# Patient Record
Sex: FEMALE | Race: BLACK OR AFRICAN AMERICAN | Employment: UNEMPLOYED | URBAN - METROPOLITAN AREA
[De-identification: names, ages, dates, MRNs, and addresses within clinical notes are randomized per-mention and may not be internally consistent; named-entity substitution may affect disease eponyms.]

---

## 2017-12-07 ENCOUNTER — HOSPITAL ENCOUNTER (EMERGENCY)
Facility: HOSPITAL | Age: 22
Discharge: HOME | End: 2017-12-08
Attending: EMERGENCY MEDICINE

## 2017-12-07 DIAGNOSIS — B34.9 VIRAL SYNDROME: Primary | ICD-10-CM

## 2017-12-07 DIAGNOSIS — M32.9 EXACERBATION OF SYSTEMIC LUPUS (HCC): ICD-10-CM

## 2017-12-07 PROCEDURE — 81025 URINE PREGNANCY TEST: CPT

## 2017-12-07 PROCEDURE — 96361 HYDRATE IV INFUSION ADD-ON: CPT

## 2017-12-07 PROCEDURE — 87798 DETECT AGENT NOS DNA AMP: CPT | Performed by: EMERGENCY MEDICINE

## 2017-12-07 PROCEDURE — 85025 COMPLETE CBC W/AUTO DIFF WBC: CPT | Performed by: EMERGENCY MEDICINE

## 2017-12-07 PROCEDURE — 81001 URINALYSIS AUTO W/SCOPE: CPT | Performed by: EMERGENCY MEDICINE

## 2017-12-07 PROCEDURE — 80048 BASIC METABOLIC PNL TOTAL CA: CPT | Performed by: EMERGENCY MEDICINE

## 2017-12-07 PROCEDURE — 99284 EMERGENCY DEPT VISIT MOD MDM: CPT

## 2017-12-07 PROCEDURE — 87581 M.PNEUMON DNA AMP PROBE: CPT | Performed by: EMERGENCY MEDICINE

## 2017-12-07 PROCEDURE — 87633 RESP VIRUS 12-25 TARGETS: CPT | Performed by: EMERGENCY MEDICINE

## 2017-12-07 PROCEDURE — 96360 HYDRATION IV INFUSION INIT: CPT

## 2017-12-07 PROCEDURE — 87486 CHLMYD PNEUM DNA AMP PROBE: CPT | Performed by: EMERGENCY MEDICINE

## 2017-12-07 RX ORDER — IBUPROFEN 600 MG/1
600 TABLET ORAL ONCE
Status: COMPLETED | OUTPATIENT
Start: 2017-12-07 | End: 2017-12-07

## 2017-12-08 VITALS
DIASTOLIC BLOOD PRESSURE: 64 MMHG | TEMPERATURE: 98 F | OXYGEN SATURATION: 100 % | SYSTOLIC BLOOD PRESSURE: 100 MMHG | HEART RATE: 106 BPM | BODY MASS INDEX: 21.79 KG/M2 | HEIGHT: 60 IN | RESPIRATION RATE: 16 BRPM | WEIGHT: 111 LBS

## 2017-12-08 NOTE — ED PROVIDER NOTES
Patient Seen in: [facility] Emergency Department    History   Patient presents with:  Lupus    Stated Complaint: tingling in legs    HPI    Patient is a 14-year-old female with lupus currently on immunosuppressant and prednisone who presents with URINALYSIS WITH CULTURE REFLEX - Abnormal; Notable for the following:        Result Value    Protein Urine 100  (*)     Ketones Urine 20  (*)     Blood Urine Large (*)     RBC URINE 293 (*)     All other components within normal limits   BASIC METABOLIC KOTA Anthony [facility] [id number]  [phone]              Medications Prescribed:  There are no discharge medications for this patient.

## 2017-12-08 NOTE — ED PROVIDER NOTES
Pt endorsed to me by Dr. Janelle Nevarez. RVP negative for influenza. I discussed with pt on the telephone at 4:40am that RVP is negative and there is no need to start tamiflu. Pt verbalizes understanding. Pt to followup with her PCP in 2-3 days.

## 2017-12-08 NOTE — ED INITIAL ASSESSMENT (HPI)
Pt presents with Lupus \"flare-up\", pain to bilateral groin since this afternoon.   Denies urinary complaints.  + n/v.

## 2018-09-27 ENCOUNTER — HOSPITAL ENCOUNTER (EMERGENCY)
Facility: HOSPITAL | Age: 23
Discharge: HOME | End: 2018-09-27
Attending: EMERGENCY MEDICINE
Payer: COMMERCIAL

## 2018-09-27 VITALS
DIASTOLIC BLOOD PRESSURE: 70 MMHG | HEIGHT: 60 IN | RESPIRATION RATE: 18 BRPM | SYSTOLIC BLOOD PRESSURE: 121 MMHG | BODY MASS INDEX: 25.79 KG/M2 | WEIGHT: 131.38 LBS | OXYGEN SATURATION: 97 % | HEART RATE: 74 BPM | TEMPERATURE: 99 F

## 2018-09-27 DIAGNOSIS — R60.9 DEPENDENT EDEMA: Primary | ICD-10-CM

## 2018-09-27 DIAGNOSIS — N30.01 ACUTE CYSTITIS WITH HEMATURIA: ICD-10-CM

## 2018-09-27 DIAGNOSIS — M32.9 SYSTEMIC LUPUS ERYTHEMATOSUS, UNSPECIFIED SLE TYPE, UNSPECIFIED ORGAN INVOLVEMENT STATUS (HCC): ICD-10-CM

## 2018-09-27 PROCEDURE — 36415 COLL VENOUS BLD VENIPUNCTURE: CPT

## 2018-09-27 PROCEDURE — 87086 URINE CULTURE/COLONY COUNT: CPT | Performed by: EMERGENCY MEDICINE

## 2018-09-27 PROCEDURE — 81001 URINALYSIS AUTO W/SCOPE: CPT | Performed by: EMERGENCY MEDICINE

## 2018-09-27 PROCEDURE — 85025 COMPLETE CBC W/AUTO DIFF WBC: CPT | Performed by: EMERGENCY MEDICINE

## 2018-09-27 PROCEDURE — 99284 EMERGENCY DEPT VISIT MOD MDM: CPT

## 2018-09-27 PROCEDURE — 80048 BASIC METABOLIC PNL TOTAL CA: CPT | Performed by: EMERGENCY MEDICINE

## 2018-09-27 RX ORDER — PREDNISONE 20 MG/1
30 TABLET ORAL ONCE
Status: COMPLETED | OUTPATIENT
Start: 2018-09-27 | End: 2018-09-27

## 2018-09-27 RX ORDER — SIMVASTATIN 20 MG
20 TABLET ORAL NIGHTLY
COMMUNITY
End: 2018-11-28

## 2018-09-27 RX ORDER — PREDNISONE 2.5 MG
60 TABLET ORAL DAILY
Status: ON HOLD | COMMUNITY
End: 2018-10-13

## 2018-09-27 RX ORDER — AZATHIOPRINE 50 MG/1
50 TABLET ORAL DAILY
Status: ON HOLD | COMMUNITY
End: 2018-10-13

## 2018-09-27 RX ORDER — FUROSEMIDE 20 MG/1
20 TABLET ORAL 2 TIMES DAILY
Status: ON HOLD | COMMUNITY
End: 2018-10-19

## 2018-09-27 RX ORDER — NITROFURANTOIN 25; 75 MG/1; MG/1
100 CAPSULE ORAL 2 TIMES DAILY
Qty: 10 CAPSULE | Refills: 0 | Status: SHIPPED | OUTPATIENT
Start: 2018-09-27 | End: 2018-10-02

## 2018-09-27 RX ORDER — PREDNISONE 10 MG/1
30 TABLET ORAL DAILY
Qty: 9 TABLET | Refills: 0 | Status: SHIPPED | OUTPATIENT
Start: 2018-09-27 | End: 2018-09-30

## 2018-09-27 NOTE — ED PROVIDER NOTES
Patient Seen in: [facility] AND [facility] Emergency Department    History   Patient presents with:  Swelling Edema (cardiovascular, metabolic)    Stated Complaint: Lupas FLare/ Edma in legs    HPI    Patient presents with her mother with concerns of swelling to pain, no nausea, no vomiting  Genitourinary: no dysuria  Musculoskeletal: no back pain    Positive for stated complaint: Lupas FLare/ Edma in legs  Other systems are as noted in HPI. Constitutional and vital signs reviewed.       All other systems reviewed wanted to discuss potentially putting her on higher dose of prednisone for a short period of time to potentially help her.   At this point we have not received return phone call I discussed with her going up on the dose at this time she did want to try it w RAINBOW DRAW DARK GREEN   RAINBOW DRAW LIGHT GREEN   RAINBOW DRAW GOLD   RAINBOW DRAW LAVENDER TALL (BNP)   URINE CULTURE, ROUTINE        MDM     99% Normal  Pulse oximetry         Disposition and Plan     We recommend that you schedule follow up care wi

## 2018-09-27 NOTE — ED INITIAL ASSESSMENT (HPI)
Pt has lupus and is experiencing some swelling to face, abdomen and both legs. States she saw her nephrologist this week and was started on a diuretic, but swelling has not changed. Denies any SOB or dyspnea.

## 2018-10-01 ENCOUNTER — TELEPHONE (OUTPATIENT)
Dept: RHEUMATOLOGY | Facility: CLINIC | Age: 23
End: 2018-10-01

## 2018-10-13 ENCOUNTER — HOSPITAL ENCOUNTER (INPATIENT)
Facility: HOSPITAL | Age: 23
LOS: 6 days | Discharge: HOME | DRG: 546 | End: 2018-10-19
Attending: EMERGENCY MEDICINE | Admitting: HOSPITALIST
Payer: COMMERCIAL

## 2018-10-13 ENCOUNTER — APPOINTMENT (OUTPATIENT)
Dept: ULTRASOUND IMAGING | Facility: HOSPITAL | Age: 23
DRG: 546 | End: 2018-10-13
Attending: EMERGENCY MEDICINE
Payer: COMMERCIAL

## 2018-10-13 ENCOUNTER — APPOINTMENT (OUTPATIENT)
Dept: CT IMAGING | Facility: HOSPITAL | Age: 23
DRG: 546 | End: 2018-10-13
Attending: EMERGENCY MEDICINE
Payer: COMMERCIAL

## 2018-10-13 DIAGNOSIS — N18.9 ACUTE RENAL FAILURE SUPERIMPOSED ON CHRONIC KIDNEY DISEASE, UNSPECIFIED CKD STAGE, UNSPECIFIED ACUTE RENAL FAILURE TYPE (HCC): Primary | ICD-10-CM

## 2018-10-13 DIAGNOSIS — N17.9 ACUTE RENAL FAILURE SUPERIMPOSED ON CHRONIC KIDNEY DISEASE, UNSPECIFIED CKD STAGE, UNSPECIFIED ACUTE RENAL FAILURE TYPE (HCC): Primary | ICD-10-CM

## 2018-10-13 DIAGNOSIS — E87.5 HYPERKALEMIA: ICD-10-CM

## 2018-10-13 PROCEDURE — 76770 US EXAM ABDO BACK WALL COMP: CPT | Performed by: EMERGENCY MEDICINE

## 2018-10-13 PROCEDURE — 74176 CT ABD & PELVIS W/O CONTRAST: CPT | Performed by: EMERGENCY MEDICINE

## 2018-10-13 PROCEDURE — 99223 1ST HOSP IP/OBS HIGH 75: CPT | Performed by: HOSPITALIST

## 2018-10-13 RX ORDER — PREDNISONE 20 MG/1
20 TABLET ORAL 3 TIMES DAILY
Status: ON HOLD | COMMUNITY
End: 2018-11-01

## 2018-10-13 RX ORDER — METOCLOPRAMIDE HYDROCHLORIDE 5 MG/ML
INJECTION INTRAMUSCULAR; INTRAVENOUS
Status: DISPENSED
Start: 2018-10-13 | End: 2018-10-14

## 2018-10-13 RX ORDER — HEPARIN SODIUM 5000 [USP'U]/ML
5000 INJECTION, SOLUTION INTRAVENOUS; SUBCUTANEOUS EVERY 12 HOURS
Status: DISCONTINUED | OUTPATIENT
Start: 2018-10-14 | End: 2018-10-19

## 2018-10-13 RX ORDER — SODIUM CHLORIDE 0.9 % (FLUSH) 0.9 %
3 SYRINGE (ML) INJECTION AS NEEDED
Status: DISCONTINUED | OUTPATIENT
Start: 2018-10-13 | End: 2018-10-19

## 2018-10-13 RX ORDER — METOCLOPRAMIDE 10 MG/1
10 TABLET ORAL 3 TIMES DAILY PRN
COMMUNITY

## 2018-10-13 RX ORDER — METOCLOPRAMIDE HYDROCHLORIDE 5 MG/ML
10 INJECTION INTRAMUSCULAR; INTRAVENOUS ONCE
Status: DISCONTINUED | OUTPATIENT
Start: 2018-10-13 | End: 2018-10-13

## 2018-10-13 RX ORDER — SODIUM CHLORIDE 9 MG/ML
INJECTION, SOLUTION INTRAVENOUS ONCE
Status: COMPLETED | OUTPATIENT
Start: 2018-10-13 | End: 2018-10-13

## 2018-10-13 RX ORDER — SODIUM POLYSTYRENE SULFONATE 15 G/60ML
30 SUSPENSION ORAL; RECTAL ONCE
Status: COMPLETED | OUTPATIENT
Start: 2018-10-13 | End: 2018-10-13

## 2018-10-13 RX ORDER — MORPHINE SULFATE 4 MG/ML
INJECTION, SOLUTION INTRAMUSCULAR; INTRAVENOUS
Status: DISPENSED
Start: 2018-10-13 | End: 2018-10-14

## 2018-10-13 RX ORDER — HYDROMORPHONE HYDROCHLORIDE 1 MG/ML
0.3 INJECTION, SOLUTION INTRAMUSCULAR; INTRAVENOUS; SUBCUTANEOUS ONCE
Status: COMPLETED | OUTPATIENT
Start: 2018-10-13 | End: 2018-10-13

## 2018-10-13 RX ORDER — MORPHINE SULFATE 4 MG/ML
4 INJECTION, SOLUTION INTRAMUSCULAR; INTRAVENOUS ONCE
Status: DISCONTINUED | OUTPATIENT
Start: 2018-10-13 | End: 2018-10-13

## 2018-10-13 RX ORDER — DEXTROSE MONOHYDRATE 25 G/50ML
50 INJECTION, SOLUTION INTRAVENOUS ONCE
Status: COMPLETED | OUTPATIENT
Start: 2018-10-13 | End: 2018-10-13

## 2018-10-13 RX ORDER — ALBUMIN (HUMAN) 12.5 G/50ML
25 SOLUTION INTRAVENOUS ONCE
Status: COMPLETED | OUTPATIENT
Start: 2018-10-13 | End: 2018-10-13

## 2018-10-13 RX ORDER — HYDROCODONE BITARTRATE AND ACETAMINOPHEN 5; 325 MG/1; MG/1
1 TABLET ORAL EVERY 6 HOURS PRN
Status: DISCONTINUED | OUTPATIENT
Start: 2018-10-13 | End: 2018-10-19

## 2018-10-13 RX ORDER — METHYLPREDNISOLONE SODIUM SUCCINATE 40 MG/ML
40 INJECTION, POWDER, LYOPHILIZED, FOR SOLUTION INTRAMUSCULAR; INTRAVENOUS EVERY 8 HOURS
Status: DISCONTINUED | OUTPATIENT
Start: 2018-10-13 | End: 2018-10-15

## 2018-10-13 RX ORDER — SULFAMETHOXAZOLE AND TRIMETHOPRIM 800; 160 MG/1; MG/1
1 TABLET ORAL DAILY
COMMUNITY
End: 2018-11-28

## 2018-10-13 NOTE — ED INITIAL ASSESSMENT (HPI)
Pt states she is having a lupus flare up. Pt states she is having abdominal swelling which is interfering with her breathing. Pt states last time it was like this she had to have her abdomen drained.

## 2018-10-13 NOTE — ED PROVIDER NOTES
Patient Seen in: [facility] Emergency Department    History   Patient presents with:  Swelling Edema (cardiovascular, metabolic)    Stated Complaint: lupus flare up     HPI    25 yo F with PMH SLE on chronic prednisone/lasix therapy presenting with Pulmonary/Chest: Effort normal. CTAB. Abdominal: Soft. Distended, nontender. Musculoskeletal: No gross deformity. Neurological: Alert. Skin: Skin is warm. Psychiatric: Cooperative. Nursing note and vitals reviewed.         ED Course     Labs Revie components:    Complement C3 69 (*)     All other components within normal limits   POCT GLUCOSE - Abnormal; Notable for the following components:    POC Glucose  132 (*)     All other components within normal limits   POCT GLUCOSE - Abnormal; Notable for 109  , sinus,      Evaluation for worsening abdominal distension without CP/SOB labs with marked LAURA/hyperkalemia, EKG nonacute.  Insulin/D50, bicarb, polystyrene initiated; no doc coverage Dr. Meagan Hansen accepting admission, nephrology Dr. Murali Mercado

## 2018-10-14 PROCEDURE — 99253 IP/OBS CNSLTJ NEW/EST LOW 45: CPT | Performed by: INTERNAL MEDICINE

## 2018-10-14 PROCEDURE — 99254 IP/OBS CNSLTJ NEW/EST MOD 60: CPT | Performed by: INTERNAL MEDICINE

## 2018-10-14 PROCEDURE — 99255 IP/OBS CONSLTJ NEW/EST HI 80: CPT | Performed by: INTERNAL MEDICINE

## 2018-10-14 PROCEDURE — 99233 SBSQ HOSP IP/OBS HIGH 50: CPT | Performed by: HOSPITALIST

## 2018-10-14 RX ORDER — ONDANSETRON 2 MG/ML
4 INJECTION INTRAMUSCULAR; INTRAVENOUS EVERY 6 HOURS PRN
Status: DISCONTINUED | OUTPATIENT
Start: 2018-10-14 | End: 2018-10-19

## 2018-10-14 RX ORDER — ATORVASTATIN CALCIUM 10 MG/1
10 TABLET, FILM COATED ORAL NIGHTLY
Status: DISCONTINUED | OUTPATIENT
Start: 2018-10-14 | End: 2018-10-19

## 2018-10-14 RX ORDER — ALBUMIN (HUMAN) 12.5 G/50ML
25 SOLUTION INTRAVENOUS EVERY 12 HOURS
Status: DISCONTINUED | OUTPATIENT
Start: 2018-10-14 | End: 2018-10-18

## 2018-10-14 RX ORDER — FUROSEMIDE 10 MG/ML
80 INJECTION INTRAMUSCULAR; INTRAVENOUS
Status: DISCONTINUED | OUTPATIENT
Start: 2018-10-14 | End: 2018-10-19

## 2018-10-14 RX ORDER — PANTOPRAZOLE SODIUM 40 MG/1
40 TABLET, DELAYED RELEASE ORAL
Status: DISCONTINUED | OUTPATIENT
Start: 2018-10-14 | End: 2018-10-14

## 2018-10-14 RX ORDER — CYANOCOBALAMIN 1000 UG/ML
1000 INJECTION INTRAMUSCULAR; SUBCUTANEOUS ONCE
Status: COMPLETED | OUTPATIENT
Start: 2018-10-14 | End: 2018-10-14

## 2018-10-14 RX ORDER — SODIUM POLYSTYRENE SULFONATE 15 G/60ML
30 SUSPENSION ORAL; RECTAL ONCE
Status: COMPLETED | OUTPATIENT
Start: 2018-10-14 | End: 2018-10-14

## 2018-10-14 RX ORDER — SODIUM CHLORIDE 9 MG/ML
INJECTION, SOLUTION INTRAVENOUS
Status: COMPLETED
Start: 2018-10-14 | End: 2018-10-14

## 2018-10-14 RX ORDER — HYDROXYCHLOROQUINE SULFATE 200 MG/1
200 TABLET, FILM COATED ORAL 2 TIMES DAILY
Status: DISCONTINUED | OUTPATIENT
Start: 2018-10-14 | End: 2018-10-19

## 2018-10-14 RX ORDER — DEXTROSE MONOHYDRATE 25 G/50ML
50 INJECTION, SOLUTION INTRAVENOUS ONCE
Status: COMPLETED | OUTPATIENT
Start: 2018-10-14 | End: 2018-10-14

## 2018-10-14 NOTE — PROGRESS NOTES
[facility]  Progress Note     Carlos Enrique Alvarado  : 2/15/1995    Status: Inpatient  Day #: 1    Attending: Jessica Montalvo MD  PCP: None Pcp      Assessment and Plan     Systemic lupus erythematosus - followed by Dr. Oswaldo Torres, her nephrologist at [facility] 10/13/18   1759  10/13/18   6493  10/14/18   0540   BUN  71*   --   70*   CREATSERUM  3.58*   --   3.39*   GFRAA  19*   --   21*   GFRNAA  17*   --   18*   CA  7.3*   --   7.6*   ALB  1.2*   --   1.8*   NA  136   --   142   K  6.0*  6.0*  5.2*   CL  112* MethylPREDNISolone Sodium Succ  40 mg Intravenous Q8H   • cefTRIAXone (ROCEPHIN) 1g/100mL  1 g Intravenous Q24H      PRN Meds: Normal Saline Flush, HYDROcodone-acetaminophen    Spent <35 minutes, <50% of the time counseling coordinating care.   Discussed la

## 2018-10-14 NOTE — PROGRESS NOTES
ADMISSION NOTE    24year old female with h/o lupus on steroids and azothiaprine until recently. Azothiaprine discontinued when patient received a \"chemo\" infusion at [facility] this past Tuesday.   Presents with anasarca, ascites and acute renal f

## 2018-10-14 NOTE — CONSULTS
Full note dictated. Discussed with Dr. Diann Salazar, hospitalist.  Chart reviewed, patient interviewed and examined.     Lupus was diagnosed in 2013 with kidney biopsy, ordered by Dr. Tigist Zacarias, her nephrologist at [facility].  In 2013 she received 6 doses of intra prednisone soon. Gastroenterology is evaluating. She may need paracentesis. LIGIA and specific antibodies have been ordered. I will order antiphospholipid antibody tests. I started her back on hydroxychloroquine 200 mg twice a day.     Rheumatology will

## 2018-10-14 NOTE — CONSULTS
[facility]    PATIENT'S NAME: Orprecious Skelton   ATTENDING PHYSICIAN: Jennifer Farmer MD   CONSULTING PHYSICIAN: Brigid Lucio MD   PATIENT ACCOUNT#:   [id number]    LOCATION:  [address] [place] [address]. [facility] #:   [id number]       DATE OF BIRTH:  [birth year] furosemide 20 mg b.i.d., and Reglan. ALLERGIES:  She is allergic to mycophenolate. SOCIAL HISTORY:  She is nonsmoker, and lives with her family. FAMILY HISTORY:  Negative for any connective tissue disorders.     REVIEW OF SYSTEMS:  The patient cu present time or what her baseline renal function is. We are attempting to obtain old records from [facility].  The patient has severe nephrotic syndrome leading to anasarca. Currently, her symptoms seem better today. She also has an anemia.   This

## 2018-10-14 NOTE — PLAN OF CARE
Problem: Patient Centered Care  Goal: Patient preferences are identified and integrated in the patient's plan of care  Interventions:  - What would you like us to know as we care for you?  I was just discharged from [facility] last Wednesday and I rec monitoring, monitor vital signs, obtain 12 lead EKG if indicated  - Evaluate effectiveness of antiarrhythmic and heart rate control medications as ordered  - Initiate emergency measures for life threatening arrhythmias  - Monitor electrolytes and administe psychosocial factors contributing to over-consumption  Outcome: Progressing      Problem: METABOLIC/FLUID AND ELECTROLYTES - ADULT  Goal: Glucose maintained within prescribed range  INTERVENTIONS:  - Monitor Blood Glucose as ordered  - Assess for signs and Consider cultural and social influences on pain and pain management  - Manage/alleviate anxiety  - Utilize distraction and/or relaxation techniques  - Monitor for opioid side effects  - Notify MD/LIP if interventions unsuccessful or patient reports new krissy

## 2018-10-14 NOTE — PLAN OF CARE
Problem: Patient Centered Care  Goal: Patient preferences are identified and integrated in the patient's plan of care  Interventions:  - What would you like us to know as we care for you?  I was just discharged from [facility] last Wednesday and I rec monitoring, monitor vital signs, obtain 12 lead EKG if indicated  - Evaluate effectiveness of antiarrhythmic and heart rate control medications as ordered  - Initiate emergency measures for life threatening arrhythmias  - Monitor electrolytes and administe scale  - Administer analgesics based on type and severity of pain and evaluate response  - Implement non-pharmacological measures as appropriate and evaluate response  - Consider cultural and social influences on pain and pain management  - Manage/alleviat

## 2018-10-14 NOTE — CONSULTS
Gastroenterology consultation note    Reason for consultation:  Ascites, transient abdominal pain in the setting of severe SLE      History of present illness: The patient is a 24year old female who is seen at the bedside today along with her mother.   Silvia Acevedo Subcutaneous Q12H   HYDROcodone-acetaminophen (NORCO) 5-325 MG per tab 1 tablet 1 tablet Oral Q6H PRN   Pantoprazole Sodium (PROTONIX) 40 mg in Sodium Chloride 0.9 % 10 mL IV push 40 mg Intravenous Nightly   MethylPREDNISolone Sodium Succ (Solu-MEDROL) inj Pertinent positives and negatives noted in the the HPI. Physical examination:  GEN:  Pleasant, smiling well-developed well-nourished female who is in no acute distress. HEENT: Negative for scleral icterus. Conjunctivae are pink.   Neck: Supple with midpole of the right kidney. No hydronephrosis. No biliary obstruction. Bowel is poorly visualized, but grossly unremarkable. No bowel obstruction or acute diverticulitis. Appendix is probably normal but not optimally visualized. .     Dictated by (CST): Rody Cook

## 2018-10-14 NOTE — H&P
[facility]    PATIENT'S NAME: Donte Krishnamurthy   ATTENDING PHYSICIAN: Lopez Haines MD   PATIENT ACCOUNT#:   [id number]    LOC[facility]  [address] [place], [place] [address]   [id number]       YOB: 1995  ADMISSION DATE:       10 6-day hospital stay. She was discharged this past Tuesday after receiving what was probably a dose of Cytoxan IV. She had some nausea, vomiting, and diarrhea related to the Cytoxan infusion, but that all resolved.   However, her abdominal distention incre with diffuse body anasarca. The pancreas was poorly visualized because of lack of oral and IV contrast, but it appeared to be densely enlarged and boggy. Lipase was drawn and was 16.   There were prominent renal sizes bilaterally and a tiny 2 mm nonobstru 94, respiratory rate 20, blood pressure 142/91, O2 saturation 100% on room air. The patient had an initial temperature of 99.5. HEENT:  Normocephalic, atraumatic. Pupils are equal, reactive to light. Sclerae are anicteric.   There was no sinus tendernes accurate intake and output. Clinically, the patient looks dehydrated, but does have significant lower extremity edema and ascites. We will give a dose of IV albumin and monitor urine output. 3.   Hyperkalemia. Recheck potassium.   If it has not decrease

## 2018-10-14 NOTE — CONSULTS
[facility]    PATIENT'S NAME: Soila Flores   ATTENDING PHYSICIAN: Sang Simons MD   CONSULTING PHYSICIAN: Diana Carpio.  Migel Randolph MD   PATIENT ACCOUNT#:   [id number]    LOCATION:  [address] [place] [address] #:   [id number]       DATE OF BIRTH:  0 Solu-Medrol daily for 3 days. Yesterday, she went to the [facility] with increased abdominal distention and discomfort. A Campbell catheter was placed; 350 mL of urine was obtained, and she has felt better since. Kidney ultrasound:  Inc Her skin is not sensitive to the sun. No shortness of breath or chest pain. No acid reflux. No blood in her stools. She has some dry mouth, but no dry eyes. Her visual acuity was not as good last night, but today is fine. No Raynaud's.   Occasional he obtained. 4.   This was discussed with Dr. Adriana Nino. Nephrology is seeing patient. 5.   Rheumatology will follow along. Thank you for the consult. Dictated By Cha Mcelroy MD  d: 10/14/2018 12:19:47  t: 10/14/2018 13:35:33  Job [id number]

## 2018-10-15 PROCEDURE — 30233N1 TRANSFUSION OF NONAUTOLOGOUS RED BLOOD CELLS INTO PERIPHERAL VEIN, PERCUTANEOUS APPROACH: ICD-10-PCS | Performed by: HOSPITALIST

## 2018-10-15 PROCEDURE — 99232 SBSQ HOSP IP/OBS MODERATE 35: CPT | Performed by: INTERNAL MEDICINE

## 2018-10-15 PROCEDURE — 99233 SBSQ HOSP IP/OBS HIGH 50: CPT | Performed by: INTERNAL MEDICINE

## 2018-10-15 PROCEDURE — 99233 SBSQ HOSP IP/OBS HIGH 50: CPT | Performed by: HOSPITALIST

## 2018-10-15 RX ORDER — SODIUM CHLORIDE 0.9 % (FLUSH) 0.9 %
10 SYRINGE (ML) INJECTION AS NEEDED
Status: DISCONTINUED | OUTPATIENT
Start: 2018-10-15 | End: 2018-10-19

## 2018-10-15 RX ORDER — SEVELAMER CARBONATE 800 MG/1
800 TABLET, FILM COATED ORAL
Status: DISCONTINUED | OUTPATIENT
Start: 2018-10-15 | End: 2018-10-19

## 2018-10-15 RX ORDER — PREDNISONE 20 MG/1
20 TABLET ORAL 3 TIMES DAILY
Status: DISCONTINUED | OUTPATIENT
Start: 2018-10-15 | End: 2018-10-19

## 2018-10-15 RX ORDER — SODIUM CHLORIDE 9 MG/ML
INJECTION, SOLUTION INTRAVENOUS ONCE
Status: COMPLETED | OUTPATIENT
Start: 2018-10-15 | End: 2018-10-15

## 2018-10-15 RX ORDER — CYANOCOBALAMIN 1000 UG/ML
1000 INJECTION INTRAMUSCULAR; SUBCUTANEOUS DAILY
Status: DISCONTINUED | OUTPATIENT
Start: 2018-10-15 | End: 2018-10-19

## 2018-10-15 NOTE — PROGRESS NOTES
[facility]    Progress Note    Svetlana Henderson Patient Status:  Inpatient    2/15/1995 MRN [id number]   Location [facility] 3W/SW Attending Wallis Lombard, MD   Hosp Day # 2 PCP None Pcp        Subjective:     Constitutional: abdominal pain went away after the Campbell catheter was placed      2. Ascites, anasarca. She is being seen by Gastroenterology. Paracentesis not necessary. It isn't infectious.       3.       Urinary retention. Urine culture negative.   She feels muc biliary obstruction. Bowel is poorly visualized, but grossly unremarkable. No bowel obstruction or acute diverticulitis. Appendix is probably normal but not optimally visualized. .     Dictated by (CST): Milo Royal MD on 10/13/2018 at 21:20     Approved

## 2018-10-15 NOTE — PROGRESS NOTES
[facility]  Progress Note     Carlos Enrique Alvarado  : 2/15/1995    Status: Inpatient  Day #: 2    Attending: Maria Alejandra Robles MD  PCP: None Pcp      Assessment and Plan     Systemic lupus erythematosus - followed by Dr. Charlene Gray, her nephrologist at [facility] 0540  10/15/18   0529   WBC  13.0*  7.6  7.5   HGB  8.3*  7.4*  6.7*   HCT  24.9*  22.2*  20.2*   PLT  260  215  161   RBC  2.93*  2.62*  2.38*   MCV  85.1  85.0  85.1   MCH  28.3  28.4  28.1   MCHC  33.3  33.5  33.0   RDW  15.5*  15.4*  14.9     Recent La -------------------------- Sinus Rhythm WITHIN NORMAL LIMITS When compared with ECG of 10/13/2018 18:49:43 No change Electronically signed on 10/14/2018 at 21:37 by Cary Lopez 12-lead    Result Date: 10/13/2018  ECG Report  Interpretation  ----

## 2018-10-15 NOTE — PROGRESS NOTES
[facility]  Nephrology Daily Progress Note    Lydia Toscano  [id number]  24year old      HPI:   Lydia Toscano is a 24year old female. Epistaxis this am but seems to be stopping. Otherwise feeling OK.        ROS:     Constitutional:  N and motor skills appropriate for age    Labs:  Lab Results   Component Value Date    WBC 7.5 10/15/2018    HGB 6.7 10/15/2018    HCT 20.2 10/15/2018     10/15/2018    CREATSERUM 2.82 10/15/2018    BUN 70 10/15/2018     10/15/2018    K 4.5 10/1 MD on 10/13/2018 at 21:20     Approved by (CST): Coleen Carmichael MD on 10/13/2018 at 21:29              Medications:    Current Facility-Administered Medications:   •  Normal Saline Flush 0.9 % injection 10 mL, 10 mL, Intravenous, PRN  •  0.9%  NaCl infusio responding to Lssix and albumin infusions and therefore will continue. Still awaiting old records from [place].  Discussed with Dr. Carmen Sheridan.              10/15/2018  Elza Lira MD

## 2018-10-16 PROCEDURE — 99233 SBSQ HOSP IP/OBS HIGH 50: CPT | Performed by: INTERNAL MEDICINE

## 2018-10-16 PROCEDURE — 99233 SBSQ HOSP IP/OBS HIGH 50: CPT | Performed by: HOSPITALIST

## 2018-10-16 PROCEDURE — 99232 SBSQ HOSP IP/OBS MODERATE 35: CPT | Performed by: INTERNAL MEDICINE

## 2018-10-16 RX ORDER — MAGNESIUM SULFATE 1 G/100ML
1 INJECTION INTRAVENOUS ONCE
Status: COMPLETED | OUTPATIENT
Start: 2018-10-16 | End: 2018-10-16

## 2018-10-16 RX ORDER — METOLAZONE 5 MG/1
5 TABLET ORAL ONCE
Status: COMPLETED | OUTPATIENT
Start: 2018-10-16 | End: 2018-10-16

## 2018-10-16 RX ORDER — METOLAZONE 5 MG/1
5 TABLET ORAL EVERY 24 HOURS
Status: DISCONTINUED | OUTPATIENT
Start: 2018-10-17 | End: 2018-10-19

## 2018-10-16 RX ORDER — PANTOPRAZOLE SODIUM 40 MG/1
40 TABLET, DELAYED RELEASE ORAL NIGHTLY
Status: DISCONTINUED | OUTPATIENT
Start: 2018-10-16 | End: 2018-10-19

## 2018-10-16 NOTE — PLAN OF CARE
Problem: Patient Centered Care  Goal: Patient preferences are identified and integrated in the patient's plan of care  Interventions:  - What would you like us to know as we care for you?  I was just discharged from [facility] last Wednesday and I rec carlos  Goal: Absence of cardiac arrhythmias or at baseline  INTERVENTIONS:  - Continuous cardiac monitoring, monitor vital signs, obtain 12 lead EKG if indicated  - Evaluate effectiveness of antiarrhythmic and heart rate control medications as ordered  - I Monitor labs and rhythm and assess patient for signs and symptoms of electrolyte imbalances  - Administer electrolyte replacement as ordered  - Monitor response to electrolyte replacements, including rhythm and repeat lab results as appropriate  - Fluid re

## 2018-10-16 NOTE — PLAN OF CARE
Problem: Patient Centered Care  Goal: Patient preferences are identified and integrated in the patient's plan of care  Interventions:  - What would you like us to know as we care for you?  I was just discharged from [facility] last Wednesday and I rec trend weights  Outcome: Progressing  Patient's VS are stable   Goal: Absence of cardiac arrhythmias or at baseline  INTERVENTIONS:  - Continuous cardiac monitoring, monitor vital signs, obtain 12 lead EKG if indicated  - Evaluate effectiveness of antiarrhy limits  INTERVENTIONS:  - Monitor labs and rhythm and assess patient for signs and symptoms of electrolyte imbalances  - Administer electrolyte replacement as ordered  - Monitor response to electrolyte replacements, including rhythm and repeat lab results

## 2018-10-16 NOTE — PROGRESS NOTES
[facility] Pharmacy Note: Route Optimization for Pantoprazole (PROTONIX)    Patient is currently on Pantoprazole (PROTONIX) 40 mg IV every 24 hours.    The patient meets the criteria to convert to the oral equivalent as established by the IV to Oral conversion pro

## 2018-10-16 NOTE — PAYOR COMM NOTE
--------------  ADMISSION REVIEW     Payor: [address]  Subscriber #:  [id number]  Authorization Number: [id number]    Admit date: 10/13/18  Admit time: 2122       Admitting Physician: Juan Jose Pinto MD  Attending Physician:  Juan Jose Pinto MD  [place] All other systems reviewed and negative except as noted above. PSFH elements reviewed from today and agreed except as otherwise stated in HPI.     Physical Exam     ED Triage Vitals [10/13/18 1736]   /90   Pulse 109   Resp 25   Temp 99.5 °F (37.5 ° VITAMIN B12 - Abnormal; Notable for the following components:    Vitamin B12 173 (*)     All other components within normal limits   IRON AND TIBC - Abnormal; Notable for the following components:     Total Iron Binding Capacity 100 (*)     Iron Saturation RAINBOW DRAW BLUE   RAINBOW DRAW LAVENDER   RAINBOW DRAW DARK GREEN   RAINBOW DRAW LIGHT GREEN   RAINBOW DRAW GOLD   RAINBOW DRAW LAVENDER TALL (BNP)   URINE CULTURE, ROUTINE      EKG    Rate, intervals and axes as noted on EKG Report.   Rate: 79   Rhythm: Filed:  10/14/2018  7:17 AM Status:  Unsigned Transcription    :  Erik Pantoja MD (Physician)         [address] NAME: Raf Ball   ATTENDING PHYSICIAN: Rubén Avelar MD   PATIENT ACCOUNT#:   [id number] In the meantime, she returned to [facility] for evaluation by her nephrologist there, Dr. Kevin Billings. Her outpatient labs apparently revealed an increase in her creatinine level.   Neither the patient nor mother know the exact numbers, and this informa When I saw the patient, she was an acutely ill-appearing young woman who was however quite pleasant and cooperative. She had been complaining of abdominal discomfort after placement of a Campbell catheter which drained about 350 mL of urine.   Her abdominal d SOCIAL HISTORY:  The patient does not smoke, drink alcohol, or use drugs. She had to take some time off for school because of her diagnosis, but did ultimately finish college and got a bachelor's in criminal justice, forensics, and sign language.   She ursula LABORATORY DATA:  Labs were previously mentioned as was the CT scan of the abdomen. Bladder ultrasound revealed prominent renal size bilaterally, two nonobstructing stones in the right kidney.   Increase in echogenicity of bilateral kidneys suggests a 10.   GI prophylaxis. Proton pump inhibitor. 11. The patient's current clinical status and proposed treatment plan was discussed with her. All of her questions were answered. She agreed with plan of care as outlined above.    12.   Urinary tract infec 10/15/2018 2101 Given 5000 Units Subcutaneous (Left Lower Abdomen) Vinnie Ball, RN      HYDROcodone-acetaminophen [facility]) 5-325 MG per tab 1 tablet     Date Action Dose Route User    10/15/2018 1751 Given 1 tablet Oral Ila Julien Progress Note      Carlos Enrique Alvarado  : 2/15/1995    Status: Inpatient  Day #: 1    Attending: Christy Haynes MD  PCP: None Pcp       Assessment and Plan      Systemic lupus erythematosus - followed by Dr. Suzanne Luciano, her nephrologist at [facility], does not h Recent Labs   Lab  10/13/18   1759  10/13/18   9923  10/14/18   0540   BUN  71*   --   70*   CREATSERUM  3.58*   --   3.39*   GFRAA  19*   --   21*   GFRNAA  17*   --   18*   CA  7.3*   --   7.6*   ALB  1.2*   --   1.8*   NA  136   --   142   K  6.0*  6.0* • pantoprazole (PROTONIX) IV push  40 mg Intravenous Nightly   • MethylPREDNISolone Sodium Succ  40 mg Intravenous Q8H   • cefTRIAXone (ROCEPHIN) 1g/100mL  1 g Intravenous Q24H      PRN Meds: Normal Saline Flush, HYDROcodone-acetaminophen     Spent <35 min HEENT:  Normocephalic, atraumatic  Cardiac:  Regular rate, regular rhythm  Pulmonary:  Clear to auscultation bilaterally, respirations unlabored  Gastrointestinal:  Distended - improved, NT, +BS  Musculoskeletal:  No joint swelling  Extremities:  Anasarca CONCLUSION:     1. There is a large amount of upper abdominal and pelvic ascites. There is diffuse body anasarca suggested. Correlate clinically.  There is poor visualization of the pancreas because of lack of oral and IV contrast, but the pancreas appears Progress Notes   Signed   Date of Service:  10/16/2018  9:18 AM               Signed                [facility]  Progress Note      Carlos Enrique Alvarado  : 2/15/1995    Status: Inpatient  Day #: 3    Attending: Nic Murdock MD  PCP: None Pcp     Psychiatric:  Normal affect, calm and appropriate     Intake/Output Summary (Last 24 hours) at 10/16/2018 0918  Last data filed at 10/16/2018 0600      Gross per 24 hour   Intake 471 ml   Output 2300 ml   Net -1829 ml                   Recent Labs   Lab  1 • pantoprazole (PROTONIX) IV push  40 mg Intravenous Nightly   • cefTRIAXone (ROCEPHIN) 1g/100mL  1 g Intravenous Q24H      PRN Meds: Normal Saline Flush, ondansetron HCl, Normal Saline Flush, HYDROcodone-acetaminophen           Daniella Monson MD

## 2018-10-16 NOTE — PROGRESS NOTES
[facility]  Progress Note     Carlos Enrique Alvarado  : 2/15/1995    Status: Inpatient  Day #: 3    Attending: Daniella Monson MD  PCP: None Pcp      Assessment and Plan     Systemic lupus erythematosus - followed by Dr. Tejinder Mott, her nephrologist at [facility] 10/16/2018 0600  Gross per 24 hour   Intake 471 ml   Output 2300 ml   Net -1829 ml         Recent Labs   Lab  10/14/18   0540  10/15/18   0529  10/15/18   1714  10/16/18   0516   WBC  7.6  7.5   --   6.4   HGB  7.4*  6.7*  8.9*  8.6*   HCT  22.2*  20.2* Normal Saline Flush, HYDROcodone-acetaminophen        Adán Sultana MD

## 2018-10-16 NOTE — PROGRESS NOTES
[facility]  Nephrology Daily Progress Note    Veronika Mcgrath  [id number]  24year old      HPI:   Veronika Mcgrath is a 24year old female. Still c/o significant edema. Otherwise feels OK.        ROS:     Constitutional:  Negative for decr appropriate for age reflexes and motor skills appropriate for age    Labs:  Lab Results   Component Value Date    WBC 6.4 10/16/2018    HGB 8.6 10/16/2018    HCT 25.6 10/16/2018     10/16/2018    CREATSERUM 2.52 10/16/2018    BUN 69 10/16/2018    NA biliary obstruction. Bowel is poorly visualized, but grossly unremarkable. No bowel obstruction or acute diverticulitis. Appendix is probably normal but not optimally visualized. .     Dictated by (CST): Harrison Bustillo MD on 10/13/2018 at 21:20     Approved chronic kidney disease ([facility] [address]     Acute renal failure superimposed on chronic kidney disease, unspecified CKD stage, unspecified acute renal failure type (HCC)     Hyperkalemia     Lupus nephritis (HCC)      2300 cc uo but weight unchanged.   Will add metola

## 2018-10-17 PROCEDURE — 99233 SBSQ HOSP IP/OBS HIGH 50: CPT | Performed by: HOSPITALIST

## 2018-10-17 PROCEDURE — 99232 SBSQ HOSP IP/OBS MODERATE 35: CPT | Performed by: INTERNAL MEDICINE

## 2018-10-17 PROCEDURE — 99233 SBSQ HOSP IP/OBS HIGH 50: CPT | Performed by: INTERNAL MEDICINE

## 2018-10-17 RX ORDER — SODIUM CHLORIDE 9 MG/ML
INJECTION, SOLUTION INTRAVENOUS
Status: COMPLETED
Start: 2018-10-17 | End: 2018-10-17

## 2018-10-17 RX ORDER — AMLODIPINE BESYLATE 5 MG/1
5 TABLET ORAL DAILY
Status: DISCONTINUED | OUTPATIENT
Start: 2018-10-17 | End: 2018-10-19

## 2018-10-17 NOTE — PROGRESS NOTES
[facility]  Nephrology Daily Progress Note    Lola Romero  [id number]  24year old      HPI:   Lola Romero is a 24year old female. Overall feels better. Less bloated. Eating well.        ROS:     Constitutional:  Negative for dec age    Labs:  Lab Results   Component Value Date    WBC 4.2 10/17/2018    HGB 8.7 10/17/2018    HCT 26.1 10/17/2018     10/17/2018    CREATSERUM 2.39 10/17/2018    BUN 66 10/17/2018     10/17/2018    K 4.5 10/17/2018     10/17/2018    CO tab 20 mg, 20 mg, Oral, TID  •  Hydroxychloroquine Sulfate (PLAQUENIL) tab 200 mg, 200 mg, Oral, BID  •  furosemide (LASIX) injection 80 mg, 80 mg, Intravenous, BID (Diuretic)  •  Albumin Human (ALBUMINAR) 25 % solution 25 g, 25 g, Intravenous, Q12H  •  at

## 2018-10-17 NOTE — PAYOR COMM NOTE
--------------REQUESTING ADDITIONAL DAY 10/17    CONTINUED STAY REVIEW    Payor: [employer] OUT OF STATE PPO  Subscriber #:  [id number]  Authorization Number: [id number]    Admit date: 10/13/18  Admit time: 2122    Admitting Physician: Monica Agudelo MD  Attending Date Action Dose Route User    10/16/2018 2006 Given 40 mg Oral Nancy Brown RN      predniSONE (DELTASONE) tab 20 mg     Date Action Dose Route User    10/17/2018 0912 Given 20 mg Oral Pola Junior RN    10/16/2018 2006 Given 20 mg Oral Patano, 1.       Systemic lupus erythematosus diagnosed in 2013 at [facility], with nephritis, anasarca, ascites, pleuropericarditis, hand cramps, fatigue, anemia, and rash.  She has followed with Dr. Kelsey Conley, nephrologist at [facility]. [facility] recently was   TP 3.4 (L) 10/14/2018     AST 31 10/14/2018     ALT 26 10/14/2018     PTT 29.1 10/15/2018     INR 1.3 (H) 10/14/2018     LIP 26 10/13/2018     ESRML 93 (H) 10/13/2018     CRP <0.5 10/13/2018     MG 2.0 10/17/2018     PHOS 6.5 (H) 10/17/2018     B12 173 ( Lymphatic: no abnormal cervical, supraclavicular or axillary adenopathy is noted  Respiratory: normal to inspection lungs are clear to auscultation bilaterally normal respiratory effort  Cardiovascular: regular rate and rhythm no murmurs, gallups, or rubs < > = values in this interval not displayed.         Imaging            Medications:     Current Facility-Administered Medications:   •  AmLODIPine Besylate (NORVASC) tab 5 mg, 5 mg, Oral, Daily  •  metolazone (ZAROXOLYN) tab 5 mg, 5 mg, Oral, Q24H  •  Pa Diuresing better with Lasix, metolazone and albumin. Will continue. Creatinine better. Still awaiting old records from [place]. RN to call again today. Maybe able to go home in a couple of days. BP too high.   Amlodipine started.                  10/17/

## 2018-10-17 NOTE — PLAN OF CARE
Problem: Patient Centered Care  Goal: Patient preferences are identified and integrated in the patient's plan of care  Interventions:  - What would you like us to know as we care for you?  I was just discharged from [facility] last Wednesday and I rec 150'S  Goal: Absence of cardiac arrhythmias or at baseline  INTERVENTIONS:  - Continuous cardiac monitoring, monitor vital signs, obtain 12 lead EKG if indicated  - Evaluate effectiveness of antiarrhythmic and heart rate control medications as ordered  - I appropriate  - Monitor I&O, WT and lab values  - Obtain nutritional consult as needed  - Evaluate psychosocial factors contributing to over-consumption  Outcome: Progressing      Problem: METABOLIC/FLUID AND ELECTROLYTES - ADULT  Goal: Glucose maintained w response  - Implement non-pharmacological measures as appropriate and evaluate response  - Consider cultural and social influences on pain and pain management  - Manage/alleviate anxiety  - Utilize distraction and/or relaxation techniques  - Monitor for op

## 2018-10-17 NOTE — PROGRESS NOTES
[facility]    Progress Note    Mariusz Mcgovern Patient Status:  Inpatient    2/15/1995 MRN [id number]   Location [facility] 3W/SW Attending Duong Isabel MD   Hosp Day # 4 PCP None Pcp        Subjective:   Mariusz Shadialison is a( the Campbell catheter was placed, and 350 mL of urine was obtained.      4.       Nosebleed  - resolved   Severe anemia.  B12 low.  Iron normal.  She received 1 unit of packed red blood cells yesterday. Hemoglobin up to 8.6.   Receiving B12.     5.       Sever

## 2018-10-17 NOTE — PROGRESS NOTES
[facility]    Progress Note    Braxton Whitlock Patient Status:  Inpatient    2/15/1995 MRN [id number]   Location [facility] 3W/SW Attending Raffaele Mckinney MD   Hosp Day # 3 PCP None Pcp        Subjective:     Constitutional:       Ascites, anasarca. Marilou Lee is being seen by Gastroenterology. Paracentesis not necessary.  It isn't infectious.       3.       Urinary retention.  Urine culture negative.  She feels much better since the Campbell catheter was placed, and 350 mL of urine was

## 2018-10-17 NOTE — PROGRESS NOTES
[facility]    Progress Note    Monika Knight Patient Status:  Inpatient    2/15/1995 MRN [id number]   Location [facility] 3W/SW Attending Franki Uribe, [address] [facility] Se Day # 4 PCP None Pcp        Subjective:     Respiratory: Nega albumin infusions followed by IV lasix  -Improved but still with significant LE edema     Anemia - drop in hgb. Had epistaxis but not a large bleed. Iron level ok.  Vit B12 low.  -check stool hemoccult  -transfused  -give vit B12     Ascites  -GI consulted

## 2018-10-18 PROCEDURE — 99233 SBSQ HOSP IP/OBS HIGH 50: CPT | Performed by: HOSPITALIST

## 2018-10-18 PROCEDURE — 99233 SBSQ HOSP IP/OBS HIGH 50: CPT | Performed by: INTERNAL MEDICINE

## 2018-10-18 RX ORDER — ALBUMIN (HUMAN) 12.5 G/50ML
25 SOLUTION INTRAVENOUS
Status: COMPLETED | OUTPATIENT
Start: 2018-10-18 | End: 2018-10-19

## 2018-10-18 NOTE — PROGRESS NOTES
[facility]    Progress Note    Patrick Levin Patient Status:  Inpatient    2/15/1995 MRN [id number]   Location [facility] 3W/SW Attending Cherie Mejias MD   Hosp Day # 5 PCP None Pcp        Subjective:     Respiratory: Nega culture, NGTD  -con't Rocephin for now     Hyperkalemia  -improved.    -monitor     Anasarca  -likely related to lupus, hypoalbuminemia  -nephrology on consult  -to get albumin infusions followed by IV lasix  -Improved but still with significant LE edema

## 2018-10-18 NOTE — PROGRESS NOTES
[facility]    Progress Note      Subjective:     Feels better.        Review of Systems:     Constitutional: negative for fatigue, fevers and weight loss  Eyes: negative for irritation, redness and visual disturbance  Ears, nose, mouth, th metolazone (ZAROXOLYN) tab 5 mg 5 mg Oral Q24H   Pantoprazole Sodium (PROTONIX) EC tab 40 mg 40 mg Oral Nightly   Normal Saline Flush 0.9 % injection 10 mL 10 mL Intravenous PRN   sevelamer (RENVELA) tab 800 mg 800 mg Oral TID CC   cyanocobalamin (VITAMI AST  24   --   31   --    --    --    --    ALT  17   --   26   --    --    --    --    BILT  0.4   --   0.5   --    --    --    --    TP  3.2*   --   3.4*   --    --    --    --     < > = values in this interval not displayed.      PTT   Date Value Ref R

## 2018-10-19 VITALS
RESPIRATION RATE: 16 BRPM | BODY MASS INDEX: 27.78 KG/M2 | OXYGEN SATURATION: 100 % | DIASTOLIC BLOOD PRESSURE: 93 MMHG | TEMPERATURE: 98 F | HEIGHT: 60 IN | WEIGHT: 141.5 LBS | SYSTOLIC BLOOD PRESSURE: 144 MMHG | HEART RATE: 87 BPM

## 2018-10-19 PROCEDURE — 99239 HOSP IP/OBS DSCHRG MGMT >30: CPT | Performed by: HOSPITALIST

## 2018-10-19 PROCEDURE — 99233 SBSQ HOSP IP/OBS HIGH 50: CPT | Performed by: INTERNAL MEDICINE

## 2018-10-19 PROCEDURE — 99232 SBSQ HOSP IP/OBS MODERATE 35: CPT | Performed by: INTERNAL MEDICINE

## 2018-10-19 RX ORDER — AMLODIPINE BESYLATE 10 MG/1
10 TABLET ORAL DAILY
Status: DISCONTINUED | OUTPATIENT
Start: 2018-10-20 | End: 2018-10-19

## 2018-10-19 RX ORDER — ALBUMIN (HUMAN) 12.5 G/50ML
25 SOLUTION INTRAVENOUS
Status: DISCONTINUED | OUTPATIENT
Start: 2018-10-19 | End: 2018-10-19

## 2018-10-19 RX ORDER — AMLODIPINE BESYLATE 5 MG/1
5 TABLET ORAL ONCE
Status: COMPLETED | OUTPATIENT
Start: 2018-10-19 | End: 2018-10-19

## 2018-10-19 RX ORDER — HYDROXYCHLOROQUINE SULFATE 200 MG/1
200 TABLET, FILM COATED ORAL 2 TIMES DAILY
Qty: 60 TABLET | Refills: 0 | Status: SHIPPED | OUTPATIENT
Start: 2018-10-19 | End: 2018-11-28

## 2018-10-19 RX ORDER — FUROSEMIDE 80 MG
80 TABLET ORAL 2 TIMES DAILY
Qty: 60 TABLET | Refills: 0 | Status: ON HOLD | OUTPATIENT
Start: 2018-10-19 | End: 2018-11-01

## 2018-10-19 RX ORDER — MAGNESIUM OXIDE 400 MG (241.3 MG MAGNESIUM) TABLET
400 TABLET ONCE
Status: COMPLETED | OUTPATIENT
Start: 2018-10-19 | End: 2018-10-19

## 2018-10-19 RX ORDER — METOLAZONE 5 MG/1
5 TABLET ORAL EVERY 24 HOURS
Qty: 30 TABLET | Refills: 0 | Status: SHIPPED | OUTPATIENT
Start: 2018-10-20 | End: 2018-11-07 | Stop reason: ALTCHOICE

## 2018-10-19 RX ORDER — AMLODIPINE BESYLATE 10 MG/1
10 TABLET ORAL DAILY
Qty: 30 TABLET | Refills: 0 | Status: ON HOLD | OUTPATIENT
Start: 2018-10-20 | End: 2018-11-01

## 2018-10-19 RX ORDER — SEVELAMER CARBONATE 800 MG/1
800 TABLET, FILM COATED ORAL
Qty: 90 TABLET | Refills: 0 | Status: SHIPPED | OUTPATIENT
Start: 2018-10-19 | End: 2018-11-28

## 2018-10-19 NOTE — DISCHARGE SUMMARY
[facility]    Discharge Summary    Jarrett Villegaspool Patient Status:  Inpatient    2/15/1995 MRN [id number]   Location [facility] 3W/SW Attending [facility] Se Day # 6 PCP None Pcp     Date of Admission: 10/13/2018 Tuesday, from [facility], after receiving what was probably a dose of Cytoxan IV. She had some nausea, vomiting, and diarrhea related to the Cytoxan infusion, but that all resolved.   However, her abdominal distention increased and the lower extr [facility] [address]  [phone]                     Discharge Condition: Stable    Discharge Medications:      Discharge Medications      START taking these medications      Instructions Prescription details   AmLODIPine Besylate 10 MG Tabs  Commonly [id number]    Phone:  [phone]   · AmLODIPine Besylate 10 MG Tabs  · furosemide 80 MG Tabs  · Hydroxychloroquine Sulfate 200 MG Tabs  · metolazone 5 MG Tabs  · Sevelamer Carbonate 800 MG Tabs             Discharge Instructions       Follow up with your

## 2018-10-19 NOTE — PROGRESS NOTES
[place] [facility]    Progress Note    Susie Robledo Patient Status:  Inpatient    2/15/1995 MRN [id number]   Location [facility] 3W/SW Attending Sanket Wilson MD   [facility] Day # 6 PCP None Pcp        Subjective:   Susie Robledo is a(       Urinary retention. Bynum Galeazzi culture negative.  She feels much better since the Campbell catheter was placed, and 350 mL of urine was obtained.      4.       Nosebleed  - resolved   Severe anemia.  B12 low.  Iron normal.  She received 1 unit of packed red

## 2018-10-19 NOTE — PROGRESS NOTES
Patient is ready for discharge per order. Discharge instructions reviewed with patient and family. Patient verbalized understanding and compliance. Telemetry removed; IV removed, site clean, dry, intact. Belongings sent with patient/family.

## 2018-10-19 NOTE — PROGRESS NOTES
[facility]  Nephrology Daily Progress Note    Mariusz Mcgovern  [id number]  24year old      HPI:   Mariusz Mcgovern is a 24year old female. Feels well and anxious to go home.   Still have not received any records from [place] despite multip exam appropriate for age reflexes and motor skills appropriate for age    Labs:  Lab Results   Component Value Date    WBC 1.7 10/19/2018    HGB 9.4 10/19/2018    HCT 27.4 10/19/2018     10/19/2018    CREATSERUM 2.18 10/19/2018    BUN 71 10/19/2018 injection 10 mL, 10 mL, Intravenous, PRN  •  sevelamer (RENVELA) tab 800 mg, 800 mg, Oral, TID CC  •  cyanocobalamin (VITAMIN B12) 1000 MCG/ML injection 1,000 mcg, 1,000 mcg, Subcutaneous, Daily  •  predniSONE (DELTASONE) tab 20 mg, 20 mg, Oral, TID  •  Hy mg tid, Lasis 80 mg bid and Metolazone 5 mg qam, Renvela, plaquenil, amlodipine. Also needs follow up re: Vitamin B12 injections.   Discussed with family, Dr. Sincere Ingram and her RN.            10/19/2018  Thersia Gaucher, MD

## 2018-10-25 ENCOUNTER — HOSPITAL ENCOUNTER (INPATIENT)
Facility: HOSPITAL | Age: 23
LOS: 7 days | Discharge: HOME | DRG: 546 | End: 2018-11-01
Attending: EMERGENCY MEDICINE | Admitting: HOSPITALIST
Payer: COMMERCIAL

## 2018-10-25 ENCOUNTER — TELEPHONE (OUTPATIENT)
Dept: RHEUMATOLOGY | Facility: CLINIC | Age: 23
End: 2018-10-25

## 2018-10-25 DIAGNOSIS — N17.9 ACUTE RENAL FAILURE SUPERIMPOSED ON CHRONIC KIDNEY DISEASE, UNSPECIFIED CKD STAGE, UNSPECIFIED ACUTE RENAL FAILURE TYPE (HCC): ICD-10-CM

## 2018-10-25 DIAGNOSIS — N04.9 ANASARCA ASSOCIATED WITH DISORDER OF KIDNEY: Primary | ICD-10-CM

## 2018-10-25 DIAGNOSIS — N30.00 ACUTE CYSTITIS WITHOUT HEMATURIA: ICD-10-CM

## 2018-10-25 DIAGNOSIS — M32.14 LUPUS NEPHRITIS (HCC): ICD-10-CM

## 2018-10-25 DIAGNOSIS — N18.9 ACUTE RENAL FAILURE SUPERIMPOSED ON CHRONIC KIDNEY DISEASE, UNSPECIFIED CKD STAGE, UNSPECIFIED ACUTE RENAL FAILURE TYPE (HCC): ICD-10-CM

## 2018-10-25 PROCEDURE — 99255 IP/OBS CONSLTJ NEW/EST HI 80: CPT | Performed by: INTERNAL MEDICINE

## 2018-10-25 PROCEDURE — 99223 1ST HOSP IP/OBS HIGH 75: CPT | Performed by: HOSPITALIST

## 2018-10-25 PROCEDURE — 99253 IP/OBS CNSLTJ NEW/EST LOW 45: CPT | Performed by: INTERNAL MEDICINE

## 2018-10-25 RX ORDER — MORPHINE SULFATE 4 MG/ML
4 INJECTION, SOLUTION INTRAMUSCULAR; INTRAVENOUS EVERY 2 HOUR PRN
Status: DISCONTINUED | OUTPATIENT
Start: 2018-10-25 | End: 2018-11-01

## 2018-10-25 RX ORDER — BUMETANIDE 0.25 MG/ML
2 INJECTION, SOLUTION INTRAMUSCULAR; INTRAVENOUS
Status: COMPLETED | OUTPATIENT
Start: 2018-10-25 | End: 2018-10-30

## 2018-10-25 RX ORDER — SODIUM CHLORIDE 9 MG/ML
INJECTION, SOLUTION INTRAVENOUS
Status: COMPLETED
Start: 2018-10-25 | End: 2018-10-25

## 2018-10-25 RX ORDER — METHYLPREDNISOLONE SODIUM SUCCINATE 125 MG/2ML
60 INJECTION, POWDER, LYOPHILIZED, FOR SOLUTION INTRAMUSCULAR; INTRAVENOUS DAILY
Status: DISCONTINUED | OUTPATIENT
Start: 2018-10-25 | End: 2018-10-25

## 2018-10-25 RX ORDER — METHYLPREDNISOLONE SODIUM SUCCINATE 40 MG/ML
40 INJECTION, POWDER, LYOPHILIZED, FOR SOLUTION INTRAMUSCULAR; INTRAVENOUS EVERY 6 HOURS
Status: DISCONTINUED | OUTPATIENT
Start: 2018-10-25 | End: 2018-10-25

## 2018-10-25 RX ORDER — BUMETANIDE 0.25 MG/ML
2 INJECTION, SOLUTION INTRAMUSCULAR; INTRAVENOUS ONCE
Status: COMPLETED | OUTPATIENT
Start: 2018-10-25 | End: 2018-10-25

## 2018-10-25 RX ORDER — SODIUM CHLORIDE 0.9 % (FLUSH) 0.9 %
3 SYRINGE (ML) INJECTION AS NEEDED
Status: DISCONTINUED | OUTPATIENT
Start: 2018-10-25 | End: 2018-11-01

## 2018-10-25 RX ORDER — ALBUMIN (HUMAN) 12.5 G/50ML
12.5 SOLUTION INTRAVENOUS EVERY 12 HOURS
Status: COMPLETED | OUTPATIENT
Start: 2018-10-25 | End: 2018-10-26

## 2018-10-25 RX ORDER — SEVELAMER CARBONATE 800 MG/1
1600 TABLET, FILM COATED ORAL
Status: DISCONTINUED | OUTPATIENT
Start: 2018-10-25 | End: 2018-10-31

## 2018-10-25 RX ORDER — METHYLPREDNISOLONE SODIUM SUCCINATE 125 MG/2ML
60 INJECTION, POWDER, LYOPHILIZED, FOR SOLUTION INTRAMUSCULAR; INTRAVENOUS DAILY
Status: COMPLETED | OUTPATIENT
Start: 2018-10-26 | End: 2018-10-28

## 2018-10-25 RX ORDER — SODIUM POLYSTYRENE SULFONATE 15 G/60ML
30 SUSPENSION ORAL; RECTAL ONCE
Status: COMPLETED | OUTPATIENT
Start: 2018-10-25 | End: 2018-10-25

## 2018-10-25 RX ORDER — ACETAMINOPHEN 325 MG/1
650 TABLET ORAL EVERY 6 HOURS PRN
Status: DISCONTINUED | OUTPATIENT
Start: 2018-10-25 | End: 2018-11-01

## 2018-10-25 RX ORDER — HEPARIN SODIUM 5000 [USP'U]/ML
5000 INJECTION, SOLUTION INTRAVENOUS; SUBCUTANEOUS EVERY 12 HOURS SCHEDULED
Status: DISCONTINUED | OUTPATIENT
Start: 2018-10-25 | End: 2018-11-01

## 2018-10-25 RX ORDER — MORPHINE SULFATE 4 MG/ML
1 INJECTION, SOLUTION INTRAMUSCULAR; INTRAVENOUS EVERY 2 HOUR PRN
Status: DISCONTINUED | OUTPATIENT
Start: 2018-10-25 | End: 2018-11-01

## 2018-10-25 RX ORDER — ONDANSETRON 2 MG/ML
4 INJECTION INTRAMUSCULAR; INTRAVENOUS EVERY 6 HOURS PRN
Status: DISCONTINUED | OUTPATIENT
Start: 2018-10-25 | End: 2018-11-01

## 2018-10-25 RX ORDER — MORPHINE SULFATE 4 MG/ML
2 INJECTION, SOLUTION INTRAMUSCULAR; INTRAVENOUS EVERY 2 HOUR PRN
Status: DISCONTINUED | OUTPATIENT
Start: 2018-10-25 | End: 2018-11-01

## 2018-10-25 NOTE — CONSULTS
[facility]    Report of Consultation    Date of Admission:  10/25/2018  Date of Consult:  10/25/2018   Reason for Consultation:     Lupus nephritis     History of Present Illness:     Patient is a 50-year-old female with past medical hist • Lupus        Past Surgical History  History reviewed. No pertinent surgical history.     Family History  Family History   Problem Relation Age of Onset   • Cancer Paternal Grandmother    • Heart Disorder Paternal Grandfather        Social History  Priyanka muscle weakness  Neurological: negative for gait problems, memory problems and seizures  Behavioral/Psych: negative for anxiety and depression      Physical Exam:   Height: --  Weight: --  BSA (Calculated - sq m): --  Pulse: 114 (10/25 1645)  BP: 143/99 (1 therapy are   as follows:   2.0 - 3.0 All indications except for mechanical prosthetic   cardiac valves. 2.5 - 3.5 Mechanical prosthetic cardiac valves. No results for input(s): BNP in the last 168 hours.   Lab Results   Component Value Date    CO

## 2018-10-25 NOTE — PROGRESS NOTES
[facility]    Report of Consultation    Jon Crain Patient Status:  Inpatient    2/15/1995 MRN [id number]   Location [facility] 5SW/SE Attending Abram Ball MD   Hosp Day # 0 PCP None Pcp     Date of Admission:  10/25/20 tobacco: Never Used    Alcohol use: No      Frequency: Never    Drug use: No       Current Medications:    Current Facility-Administered Medications:  bumetanide (BUMEX) injection 2 mg 2 mg Intravenous BID (Diuretic)   MethylPREDNISolone Sodium Succ (Solu- Systems:   Constitutional: Denies fever, chills  Eyes: Denies dry eyes, blurry vision, redness of the eyes, pain in the eyes  ENT: Denies dry mouth, loss of taste, sores in the mouth, or difficulty swallowing   Cardiovascular: Denies chest pain, palpitatio CREATSERUM 2.84 (H) 10/25/2018    BUN 74 (H) 10/25/2018     10/25/2018    K 5.8 (H) 10/25/2018     (H) 10/25/2018    CO2 21 (L) 10/25/2018     (H) 10/25/2018    CA 8.3 (L) 10/25/2018    ALB 2.9 (L) 10/19/2018    ALKPHO 35 10/14/2018    T LEUKOCYTES      Negative Moderate (A)    ASCORBIC ACID      Negative mg/dL Negative    SQUAM EPI CELLS UR      /HPF Few    HYALINE CASTS      0 - 5 /LPF 7 (H)    WBC      0 - 5 /HPF 10 (H)    RBC      0 - 3 /HPF 20 (H)    BACTERIA      None Seen /HPF Mod g/dL     Hematocrit      35.0 - 48.0 %     MCV      80.0 - 100.0 fL     MCH      27.0 - 32.0 pg     MCHC      32.0 - 37.0 g/dl     RDW      11.0 - 15.0 %     Platelet Count      266 - 400 K/UL     MEAN PLATELET VOLUME      7.4 - 10.3 fL     Neutrophils % CREATININE UR RANDOM      mg/dL  97.5   MALB URINE      0.0 - 1.8 mg/dL  537.0 (H)   MALB/CRE CALC      0.0 - 20.0  5,507.7 (H)   Cardiolipin IgG Antibody      0.0 - 14.9 GPL 3.2    Cardiolipin IgM Antibody      0.0 - 12.4 MPL 8.5    BETA 2 GLYCOPROTEIN nephrology will be evaluating patient and she will likely receive another dose of IV Cytoxan.  Will need to monitor CBC  - Continue IV solumedrol 60 mg daily and hydroxychloroquine 200 mg twice a day  - Labs during last visit: LIGIA 1:160, +SSA, C3 69, ESR 93

## 2018-10-25 NOTE — ED PROVIDER NOTES
Patient Seen in: [facility] Emergency Department    History   Patient presents with:  Swelling Edema (cardiovascular, metabolic)    Stated Complaint: Fluid retention Hx of Lupus.  Recently discharge a week ago    HPI    Patient presents to the [address] Smoker      Smokeless tobacco: Never Used    Alcohol use: Yes      Comment: socially    Drug use: No      Review of Systems  Constitutional: no fever  HEENT: No cough, no congestion  Cardiovascular: no chest pain  Respiratory: no shortness of breath  Zully Corey tachycardia    Patient workup shows potassium of 5.8 with GFR of 2.84 with GFR of 26 on October 19 the GFR was 36 with a creatinine of 2.18. Phosphate is 7.1. I did discuss with Dr. Nataliia Ryder he accepted patient for admission.   He requested nephrology and WITH PLATELET    Narrative: The following orders were created for panel order CBC WITH DIFFERENTIAL WITH PLATELET.   Procedure                               Abnormality         Status                     ---------                               ---------

## 2018-10-25 NOTE — ED NOTES
Pt to ER with parents with c/o increased generalized body swelling. Pt with hx of Lupus. Pt abdomen distended and firm. +labial swelling noted. +edema noted to arms and legs. Pt denies chest pain or sob. No respiratory distress noted. 99% on room air.  Pt

## 2018-10-25 NOTE — H&P
[facility]    PATIENT'S NAME: Anitha Durham   ATTENDING PHYSICIAN: Minda Harada, MD   PATIENT ACCOUNT#:   [id number]    LOCATION:  [place] [address]  MEDICAL RECORD #:   [id number]       YOB: 1995  ADMISSION DATE:       10/25/2 review of systems negative. PHYSICAL EXAMINATION:    GENERAL:  Alert and oriented to time, place, and person. Moderate distress.    VITAL SIGNS:  Temperature 98.9, pulse 116, respiratory rate 20, blood pressure 136/100, pulse oximetry 100% on room air

## 2018-10-25 NOTE — TELEPHONE ENCOUNTER
Phoned patient and spoke to mother, scheduled a consult with Dr. Fariba Moreno for 11-9 at [facility]. She is a hospital follow up. Mom reported she is on the way to the hospital ER now, due to swelling. Will let Dr. Fariba Moreno know.  SOURAV

## 2018-10-26 ENCOUNTER — MED REC SCAN ONLY (OUTPATIENT)
Dept: NEPHROLOGY | Facility: CLINIC | Age: 23
End: 2018-10-26

## 2018-10-26 PROCEDURE — 99232 SBSQ HOSP IP/OBS MODERATE 35: CPT | Performed by: INTERNAL MEDICINE

## 2018-10-26 PROCEDURE — 3E0F7GC INTRODUCTION OF OTHER THERAPEUTIC SUBSTANCE INTO RESPIRATORY TRACT, VIA NATURAL OR ARTIFICIAL OPENING: ICD-10-PCS | Performed by: HOSPITALIST

## 2018-10-26 PROCEDURE — 99233 SBSQ HOSP IP/OBS HIGH 50: CPT | Performed by: INTERNAL MEDICINE

## 2018-10-26 PROCEDURE — 99233 SBSQ HOSP IP/OBS HIGH 50: CPT | Performed by: HOSPITALIST

## 2018-10-26 RX ORDER — HYDROXYCHLOROQUINE SULFATE 200 MG/1
200 TABLET, FILM COATED ORAL 2 TIMES DAILY
Status: DISCONTINUED | OUTPATIENT
Start: 2018-10-26 | End: 2018-11-01

## 2018-10-26 RX ORDER — DIPHENHYDRAMINE HYDROCHLORIDE 50 MG/ML
50 INJECTION INTRAMUSCULAR; INTRAVENOUS ONCE
Status: COMPLETED | OUTPATIENT
Start: 2018-10-26 | End: 2018-10-26

## 2018-10-26 RX ORDER — SODIUM CHLORIDE 9 MG/ML
INJECTION, SOLUTION INTRAVENOUS
Status: COMPLETED
Start: 2018-10-26 | End: 2018-10-26

## 2018-10-26 RX ORDER — ATORVASTATIN CALCIUM 10 MG/1
10 TABLET, FILM COATED ORAL NIGHTLY
Status: DISCONTINUED | OUTPATIENT
Start: 2018-10-26 | End: 2018-11-01

## 2018-10-26 RX ORDER — IPRATROPIUM BROMIDE AND ALBUTEROL SULFATE 2.5; .5 MG/3ML; MG/3ML
3 SOLUTION RESPIRATORY (INHALATION) EVERY 6 HOURS PRN
Status: DISCONTINUED | OUTPATIENT
Start: 2018-10-26 | End: 2018-10-26

## 2018-10-26 RX ORDER — METOLAZONE 2.5 MG/1
2.5 TABLET ORAL ONCE
Status: COMPLETED | OUTPATIENT
Start: 2018-10-26 | End: 2018-10-26

## 2018-10-26 RX ORDER — ONDANSETRON 2 MG/ML
8 INJECTION INTRAMUSCULAR; INTRAVENOUS ONCE
Status: COMPLETED | OUTPATIENT
Start: 2018-10-26 | End: 2018-10-26

## 2018-10-26 RX ORDER — HYDROXYCHLOROQUINE SULFATE 200 MG/1
200 TABLET, FILM COATED ORAL DAILY
Status: DISCONTINUED | OUTPATIENT
Start: 2018-10-26 | End: 2018-10-26

## 2018-10-26 RX ORDER — IPRATROPIUM BROMIDE AND ALBUTEROL SULFATE 2.5; .5 MG/3ML; MG/3ML
3 SOLUTION RESPIRATORY (INHALATION) EVERY 6 HOURS PRN
Status: DISCONTINUED | OUTPATIENT
Start: 2018-10-26 | End: 2018-11-01

## 2018-10-26 NOTE — PLAN OF CARE
Problem: Patient Centered Care  Goal: Patient preferences are identified and integrated in the patient's plan of care  Interventions:  - What would you like us to know as we care for you?  - Provide timely, complete, and accurate information to patient/fam assessment.  - Educate pt/family on patient safety including physical limitations  - Instruct pt to call for assistance with activity based on assessment  - Modify environment to reduce risk of injury  - Provide assistive devices as appropriate  - Consider

## 2018-10-26 NOTE — PROGRESS NOTES
[facility]  Nephrology Daily Progress Note    Jarrett Parsons  [id number]  24year old      HPI:   Jarrett Parsons is a 24year old female. Still feels very bloated.        ROS:     Constitutional:  Negative for decreased activity, fever, i Component Value Date    WBC 7.0 10/26/2018    HGB 8.9 10/26/2018    HCT 26.6 10/26/2018     10/26/2018    CREATSERUM 2.89 10/26/2018    BUN 75 10/26/2018     10/26/2018    K 5.2 10/26/2018     10/26/2018    CO2 24 10/26/2018     Intramuscular, Prior to discharge  •  Albumin Human (ALBUMINAR) 25 % solution 12.5 g, 12.5 g, Intravenous, Q12H  •  sevelamer (RENVELA) tab 800 mg, 1,600 mg, Oral, TID CC  •  MethylPREDNISolone Sodium Succ (Solu-MEDROL) injection 60 mg, 60 mg, Intravenous,

## 2018-10-26 NOTE — PLAN OF CARE
DISCHARGE PLANNING    • Discharge to home or other facility with appropriate resources Progressing          PAIN - ADULT    • Verbalizes/displays adequate comfort level or patient's stated pain goal Progressing          RISK FOR INFECTION - ADULT    • Abse

## 2018-10-26 NOTE — PROGRESS NOTES
[facility]  Progress Note     Carlos Enrique Alvarado  : 2/15/1995    Status: Inpatient  Day #: 1    Attending: Justin Christian MD  PCP: None Pcp      Assessment and Plan     Systemic lupus erythematosus   -rheumatology on consult  -did not tolerate c 2.84*   --   2.89*   GFRAA  26*   --   25*   GFRNAA  22*   --   22*   CA  8.3*   --   7.8*   NA  140   --   140   K  5.8*  6.0*  5.2*   CL  111*   --   109   CO2  21*   --   24   GLU  136*   --   132*   MG  2.0   --    --    PHOS  7.1*   --    --    CRP

## 2018-10-26 NOTE — PROGRESS NOTES
Pt here for C 1 D 1.   Arrives Ambulating independently, accompanied by Family member           Modifications in dose or schedule: No     Frequency of blood return and site check throughout administration: Prior to administration and At completion of therap

## 2018-10-26 NOTE — PROGRESS NOTES
[facility]    Progress Note    Aletha Livingston Patient Status:  Inpatient    2/15/1995 MRN [id number]   Location [facility] 4W/SW/SE Attending Shon Pierce MD   Hosp Day # 1 PCP None Pcp        Subjective:     Constitutional: visit: LIGIA 1:160, +SSA, C3 69, ESR 93, alb/Cr ration 5.5 g. Neg dsdna, C4 normal, CRP normal, APL Abs negative     2. Anasarca secondary to above  - Pt will be diuresed with Bumex 2 mg IV twice a day, albumin, monitor urine output.     3.  Hyperkalemia and

## 2018-10-26 NOTE — PLAN OF CARE
Verbalizes/displays adequate comfort level or patient's stated pain goal Progressing        Pt transferred from 5th floor for administration of Cytoxan. VSS. Pre-meds given. Will continue to monitor.

## 2018-10-27 PROCEDURE — 99232 SBSQ HOSP IP/OBS MODERATE 35: CPT | Performed by: HOSPITALIST

## 2018-10-27 PROCEDURE — 99232 SBSQ HOSP IP/OBS MODERATE 35: CPT | Performed by: INTERNAL MEDICINE

## 2018-10-27 PROCEDURE — 99233 SBSQ HOSP IP/OBS HIGH 50: CPT | Performed by: INTERNAL MEDICINE

## 2018-10-27 RX ORDER — METOLAZONE 2.5 MG/1
5 TABLET ORAL ONCE
Status: COMPLETED | OUTPATIENT
Start: 2018-10-27 | End: 2018-10-27

## 2018-10-27 NOTE — PROGRESS NOTES
[facility]  [facility] Daily [facility]    Cheo Lin  [id number]  24year old      HPI:   Cheo Lin is a 24year old female. Feels better. Less bloated and tolerated Cytoxan yesterday any problems.        ROS:     Constitut age reflexes and motor skills appropriate for age    Labs:  Lab Results   Component Value Date    WBC 8.0 10/27/2018    HGB 8.6 10/27/2018    HCT 25.6 10/27/2018     10/27/2018    CREATSERUM 2.70 10/27/2018    BUN 76 10/27/2018     10/27/2018 Units, Subcutaneous, 2 times per day  •  acetaminophen (TYLENOL) tab 650 mg, 650 mg, Oral, Q6H PRN  •  ondansetron HCl (ZOFRAN) injection 4 mg, 4 mg, Intravenous, Q6H PRN  •  morphINE sulfate (PF) 4 MG/ML injection 1 mg, 1 mg, Intravenous, Q2H PRN **OR** m

## 2018-10-27 NOTE — PROGRESS NOTES
[facility]    Progress Note    Fred Beltran Patient Status:  Inpatient    2/15/1995 MRN [id number]   Location [facility] 4W/SW/SE Attending Juan Manuel Ramirez, [address] [facility] Day # 2 PCP None Pcp        Subjective:     Constitutiona last visit: LIGIA 1:160, +SSA, ESR 93, alb/Cr ration 5.5 g. Neg dsdna, APL Abs negative     2.  Anasarca secondary to above  - Improving, with Bumex 2 mg IV twice a day, and albumin. Good urine output today.     3. Hyperkalemia.  Normal today.

## 2018-10-27 NOTE — PROGRESS NOTES
[facility]    Progress Note    Estrella Armstrong Patient Status:  Inpatient    2/15/1995 MRN [id number]   Location [facility] 4W/SW/SE Attending Inderjit Machado, [address] [facility] Day # 2 PCP None Pcp       Subjective:   Estrella Armstrong is (RENVELA) tab 800 mg 1,600 mg Oral TID CC   MethylPREDNISolone Sodium Succ (Solu-MEDROL) injection 60 mg 60 mg Intravenous Daily       Lab Results   Component Value Date    WBC 8.0 10/27/2018    HGB 8.6 (L) 10/27/2018    HCT 25.6 (L) 10/27/2018     ascites  - related to lupus, hypoalbuminemia  -nephrology on consult  -s/p albumin infusions followed by IV lasix  -Improved but still with significant LE edema     Hyperkalemia  -resolved     Other:  -HL  -anemia of chronic disease     DVT proph: heparin

## 2018-10-28 PROCEDURE — 99233 SBSQ HOSP IP/OBS HIGH 50: CPT | Performed by: HOSPITALIST

## 2018-10-28 PROCEDURE — 99233 SBSQ HOSP IP/OBS HIGH 50: CPT | Performed by: INTERNAL MEDICINE

## 2018-10-28 RX ORDER — METOPROLOL TARTRATE 50 MG/1
50 TABLET, FILM COATED ORAL
Status: DISCONTINUED | OUTPATIENT
Start: 2018-10-28 | End: 2018-11-01

## 2018-10-28 RX ORDER — METOLAZONE 2.5 MG/1
5 TABLET ORAL ONCE
Status: COMPLETED | OUTPATIENT
Start: 2018-10-28 | End: 2018-10-28

## 2018-10-28 RX ORDER — AMLODIPINE BESYLATE 5 MG/1
5 TABLET ORAL DAILY
Status: DISCONTINUED | OUTPATIENT
Start: 2018-10-28 | End: 2018-11-01

## 2018-10-28 RX ORDER — PREDNISONE 20 MG/1
20 TABLET ORAL 2 TIMES DAILY WITH MEALS
Status: DISCONTINUED | OUTPATIENT
Start: 2018-10-29 | End: 2018-11-01

## 2018-10-28 NOTE — PROGRESS NOTES
[facility]  Progress Note     Lynne Choe  : 2/15/1995    Status: Inpatient  Day #: 3    Attending: Concepción Metz MD  PCP: None Pcp      Assessment and Plan     Systemic lupus erythematosus   -rheumatology on consult  -did not tolerate c 10/25/18   1355   10/26/18   0743  10/27/18   0527  10/28/18   0519   BUN  74*   --   75*  76*  75*   CREATSERUM  2.84*   --   2.89*  2.70*  2.02*   GFRAA  26*   --   25*  27*  39*   GFRNAA  22*   --   22*  24*  34*   CA  8.3*   --   7.8*  7.7*  7.6*   ALB

## 2018-10-28 NOTE — PROGRESS NOTES
[facility]  Nephrology Daily Progress Note    Pat Stage  [id number]  24year old      HPI:   Pat Stage is a 24year old female. Overall feels better. Less bloated. Eating well and ambulating.        ROS:     Constitutional: clubbing  Neurological: exam appropriate for age reflexes and motor skills appropriate for age    Labs:  Lab Results   Component Value Date    WBC 9.2 10/28/2018    HGB 9.2 10/28/2018    HCT 27.0 10/28/2018     10/28/2018    CREATSERUM 2.02 10/28/20 PRN  •  Heparin Sodium (Porcine) 5000 UNIT/ML injection 5,000 Units, 5,000 Units, Subcutaneous, 2 times per day  •  acetaminophen (TYLENOL) tab 650 mg, 650 mg, Oral, Q6H PRN  •  ondansetron HCl (ZOFRAN) injection 4 mg, 4 mg, Intravenous, Q6H PRN  •  morphI

## 2018-10-29 PROCEDURE — 99232 SBSQ HOSP IP/OBS MODERATE 35: CPT | Performed by: INTERNAL MEDICINE

## 2018-10-29 PROCEDURE — 99232 SBSQ HOSP IP/OBS MODERATE 35: CPT | Performed by: HOSPITALIST

## 2018-10-29 RX ORDER — METOLAZONE 2.5 MG/1
5 TABLET ORAL ONCE
Status: COMPLETED | OUTPATIENT
Start: 2018-10-29 | End: 2018-10-29

## 2018-10-29 NOTE — PAYOR COMM NOTE
--------------  ADMISSION REVIEW     Payor: [address]  Subscriber #:  [id number]  Authorization Number: [id number]    Admit date: 10/25/18  Admit time: [address] [facility],[address]       Admitting Physician: Ruth Ann Young MD  Attending Physician:  Marybel Damon MD Sevelamer Carbonate 800 MG Oral Tab,  Take 1 tablet (800 mg total) by mouth 3 (three) times daily with meals. Hydroxychloroquine Sulfate 200 MG Oral Tab,  Take 1 tablet (200 mg total) by mouth 2 (two) times daily.    Metoclopramide HCl 10 MG Oral Tab,  T Cardiovascular:  Normal S1 and S2, no murmur, regular, with good peripheral perfusion. She has 4+ edema to lower extremities  Respiratory:  Lungs clear to auscultation bilaterally with good effort. No wheezes, ronchi, or rales.   Abdomen:    Abdomen is di URINALYSIS WITH CULTURE REFLEX - Abnormal; Notable for the following components:    Clarity Urine Hazy (*)     Protein Urine >=500 (*)     Blood Urine Moderate (*)     Leukocyte Esterase Urine Moderate (*)     Hyaline Casts 7 (*)     WBC Urine 10 (*)     R Anasarca associated with disorder of kidney  (primary encounter diagnosis)  Acute renal failure superimposed on chronic kidney disease, unspecified CKD stage, unspecified acute renal failure type ([facility] [address]  Lupus nephritis (HCC)  Acute cystitis without hematur HISTORY OF PRESENT ILLNESS:  The patient is a 58-year-old [facility] American female who was recently diagnosed with lupus nephritis, being supposed to start on Cytoxan infusions at [facility], received only 1 infusion, and was hospitalized around 1 wee HEENT:  Atraumatic. Oropharynx clear. Moist mucous membranes. Positive skin pallor. Eyes:  Anicteric sclerae. Pupils are equal, round, and reactive to light and accommodation. NECK:  Supple. No lymphadenopathy. Trachea midline.   Full range of mot 10/28/2018 2028 Given 5000 Units Subcutaneous (Right Upper Arm) Mis Sun RN    10/28/2018 3101 Given 5000 Units Subcutaneous (Right Upper Arm) Jero Murphy, RN      Hydroxychloroquine Sulfate (PLAQUENIL) tab 200 mg     Date Action Dose Route User  The patient was diagnosed with systemic lupus erythematosus in 2013.  Apparently presented with anasarca, ascites, and lower extremity edema.  She had a kidney biopsy at that time.  Followed by Dr. Tigist Zacarias, who is a nephrologist, at [facility].  She ap Other Topics            Concern    None on file     Social History Narrative    None on file              Current Medications:     Current Facility-Administered Medications:  bumetanide (BUMEX) injection 2 mg 2 mg Intravenous BID (Diuretic)   MethylPREDNIS Temp: 98.9 °F (37.2 °C) (10/25 1353)  Do Not Use - Resp Rate: --  SpO2: 100 % (10/25 1645)  Temp:  [98.9 °F (37.2 °C)-99.4 °F (37.4 °C)] 98.9 °F (37.2 °C)  Pulse:  [114-145] 114  Resp:  [15-20] 20  BP: (136-143)/() 143/99  SpO2: 100 %   No intake or Lab Results   Component Value Date     COLORUR Yellow 10/25/2018     CLARITY Hazy 10/25/2018     SPECGRAVITY 1.013 10/25/2018     GLUUR Negative 10/25/2018     BILUR Negative 10/25/2018     KETUR Negative 10/25/2018     BLOODURINE Moderate 10/25/2018     P

## 2018-10-29 NOTE — PROGRESS NOTES
[facility]  Nephrology Daily Progress Note    Veronika Mcgrath  [id number]  24year old      HPI:   Veronika Mcgrath is a 24year old female. Continues to feel better. Less bloated and eating well.        ROS:     Constitutional:  Negative f reflexes and motor skills appropriate for age    Labs:  Lab Results   Component Value Date    WBC 10.0 10/29/2018    HGB 9.7 10/29/2018    HCT 28.2 10/29/2018     10/29/2018    CREATSERUM 1.81 10/29/2018    BUN 70 10/29/2018     10/29/2018 ondansetron HCl (ZOFRAN) injection 4 mg, 4 mg, Intravenous, Q6H PRN  •  morphINE sulfate (PF) 4 MG/ML injection 1 mg, 1 mg, Intravenous, Q2H PRN **OR** morphINE sulfate (PF) 4 MG/ML injection 2 mg, 2 mg, Intravenous, Q2H PRN **OR** morphINE sulfate (PF) 4

## 2018-10-29 NOTE — PAYOR COMM NOTE
--------------CONTINUED STAY FOR 10/26, 10/27 AND 10/28  CONTINUED STAY REVIEW    Payor: [facility]  Subscriber #:  [id number]  Authorization Number: [id number]    Admit date: 10/25/18  Admit time: [address] [facility],[address]    Admitting Physician: Miki Barlow Date Action Dose Route User    10/28/2018 1730 Given 1600 mg Oral Jona Estimable, RN    10/28/2018 1244 Given 1600 mg Oral Jona Estimable, RN    10/28/2018 7089 Given 1600 mg Oral Mariann Murphy, RN        Humza Granado MD   Physician   Hospitalist   P Intake 900 ml   Output 1900 ml   Net -1000 ml                 Recent Labs   Lab  10/25/18   1355  10/26/18   0743   WBC  8.3  7.0   HGB  10.5*  8.9*   HCT  30.9*  26.6*   PLT  342  275   RBC  3.61*  3.11*   MCV  85.6  85.5   MCH  28.9  28.6   MCHC  33.8  [facility]     Progress Note           Aleksander Lee Patient Status:  Inpatient    2/15/1995 MRN [id number]   Location [facility] 4W/SW/SE Attending Javier Cedillo, [facility] Day # 2 PCP None Pcp         Subjective:   Surekha Benítez influenza vaccine split quad (FLULAVAL) ages 7 months to 72 years inj 0.5ml 0.5 mL Intramuscular Prior to discharge   sevelamer (RENVELA) tab 800 mg 1,600 mg Oral TID CC   MethylPREDNISolone Sodium Succ (Solu-MEDROL) injection 60 mg 60 mg Intravenous Daily Lupus nephritis with progressively worsening renal function  Nephrotic syndrome  -nephrology on consult  -diuresing per nephrology - bumex IV.  Getting dose of metolazone again today.  -monitor wt, I/O  -cytoxan infusion     Anasarca, ascites  - related to General:  Alert, no distress  HEENT:  Normocephalic, atraumatic  Neck:  Supple, symmetrical  Cardiac:  Regular rate, regular rhythm  Pulmonary:  Clear to auscultation bilaterally, respirations unlabored  Gastrointestinal:  Soft, non-tender, normal bowel so • Heparin Sodium (Porcine)  5,000 Units Subcutaneous 2 times per day   • Sevelamer Carbonate  1,600 mg Oral TID CC      PRN Meds: ipratropium-albuterol, Normal Saline Flush, acetaminophen, ondansetron HCl, morphINE sulfate **OR** morphINE sulfate **OR** mo

## 2018-10-29 NOTE — PROGRESS NOTES
[facility]    Progress Note    Miles Aranda Patient Status:  Inpatient    2/15/1995 MRN [id number]   Location [facility] 4W/SW/SE Attending Kaushik Palacios MD   Hosp Day # 4 PCP None Pcp        Subjective:     Constitutional: Yoana Harrison will be writing her cyclophosphamide and prednisone prescriptions  I will follow up as an outpatient in 1 month.     2.  Anasarca secondary to above  - Improving, with diuresis, and albumin. Good urine output today.     3. Hyperkalemia.  Normal toda

## 2018-10-29 NOTE — PROGRESS NOTES
[facility]  Progress Note     Monika Antonia  : 2/15/1995    Status: Inpatient  Day #: 4    Attending: Teresa Asencio MD  PCP: None Pcp      Assessment and Plan     Systemic lupus erythematosus   -rheumatology on consult  -did not tolerate c 25.6*  27.0*  28.2*   PLT  259  195  161   RBC  2.97*  3.14*  3.30*   MCV  86.4  85.7  85.5   MCH  29.1  29.3  29.4   MCHC  33.7  34.2  34.3   RDW  15.0  15.2*  15.0     Recent Labs   Lab  10/25/18   1355   10/27/18   0527  10/28/18   0519  10/29/18   7561

## 2018-10-30 PROCEDURE — 99233 SBSQ HOSP IP/OBS HIGH 50: CPT | Performed by: INTERNAL MEDICINE

## 2018-10-30 PROCEDURE — 99233 SBSQ HOSP IP/OBS HIGH 50: CPT | Performed by: HOSPITALIST

## 2018-10-30 RX ORDER — METOLAZONE 2.5 MG/1
5 TABLET ORAL ONCE
Status: COMPLETED | OUTPATIENT
Start: 2018-10-30 | End: 2018-10-30

## 2018-10-30 RX ORDER — BUMETANIDE 1 MG/1
2 TABLET ORAL 2 TIMES DAILY
Status: DISCONTINUED | OUTPATIENT
Start: 2018-10-31 | End: 2018-11-01

## 2018-10-30 RX ORDER — METOLAZONE 2.5 MG/1
5 TABLET ORAL DAILY
Status: DISCONTINUED | OUTPATIENT
Start: 2018-10-31 | End: 2018-11-01

## 2018-10-30 NOTE — PROGRESS NOTES
[facility]  Nephrology Daily Progress Note    Felix Guidry  [id number]  24year old      HPI:   Felix Guidry is a 24year old female. Feels well and less bloated.         ROS:     Constitutional:  Negative for decreased activity, feve appropriate for age    Labs:  Lab Results   Component Value Date    WBC 9.7 10/30/2018    HGB 9.1 10/30/2018    HCT 26.8 10/30/2018     10/30/2018    CREATSERUM 1.82 10/30/2018    BUN 68 10/30/2018     10/30/2018    K 4.6 10/30/2018     times per day  •  acetaminophen (TYLENOL) tab 650 mg, 650 mg, Oral, Q6H PRN  •  ondansetron HCl (ZOFRAN) injection 4 mg, 4 mg, Intravenous, Q6H PRN  •  morphINE sulfate (PF) 4 MG/ML injection 1 mg, 1 mg, Intravenous, Q2H PRN **OR** morphINE sulfate (PF) 4

## 2018-10-30 NOTE — PAYOR COMM NOTE
--------------REQUESTING ADDITIONAL DAYS 10/29 AND 10/30    CONTINUED STAY REVIEW    Payor: [address]  Subscriber #:  [id number]  Authorization Number: [id number]    Admit date: 10/25/18  Admit time: [address] [facility],[address]    Admitting Physician: Cedrick Orozco 10/29/2018 2229 Given 3 mL Intravenous Yu WILDER RN      ondansetron HCl [facility]) injection 4 mg     Date Action Dose Route User    10/29/2018 2229 Given 4 mg Intravenous Yu WILDER RN      predniSONE (DELTASONE) tab 20 mg     Date Acti Resp:  [16-18] 16  BP: (122-140)/(85-98) 128/85  Patient Weight for the past 72 hrs:    Weight   10/27/18 1305 140 lb 8 oz (63.7 kg)   10/28/18 0638 138 lb 6.4 oz (62.8 kg)   10/29/18 0639 136 lb 8 oz (61.9 kg)   10/30/18 0600 133 lb 6.4 oz (60.5 kg)     ALT  31   --    --    --    BILT  0.5   --    --    --    TP  3.4*   --    --    --    PHOS  7.5*  6.1*  5.2*  5.6*         Imaging            Medications:     Current Facility-Administered Medications:   •  metolazone (ZAROXOLYN) tab 5 mg, 5 mg, Oral, Onc Acute renal failure superimposed on chronic kidney disease, unspecified CKD stage, unspecified acute renal failure type (HCC)     Hyperkalemia     Lupus nephritis ([facility] [address]     Anasarca associated with disorder of kidney     Acute cystitis without hematuria Pulse:  [] 99  Resp:  [18] 18  BP: (137-156)/() 138/90  General:  Alert, no distress  HEENT:  Normocephalic, atraumatic  Neck:  Supple, symmetrical  Cardiac:  Regular rate, regular rhythm  Pulmonary:  Clear to auscultation bilaterally, respirat • atorvastatin  10 mg Oral Nightly   • Hydroxychloroquine Sulfate  200 mg Oral BID   • bumetanide  2 mg Intravenous BID (Diuretic)   • Heparin Sodium (Porcine)  5,000 Units Subcutaneous 2 times per day   • Sevelamer Carbonate  1,600 mg Oral TID CC      PRN

## 2018-10-30 NOTE — PROGRESS NOTES
[facility]  Progress Note     Carlos Enrique Samchastity  : 2/15/1995    Status: Inpatient  Day #: 5    Attending: Marai Alejandra Robles MD  PCP: None Pcp      Assessment and Plan     Systemic lupus erythematosus   -rheumatology on consult  -did not tolerate c 3.14*  3.30*  3.13*   MCV  85.7  85.5  85.7   MCH  29.3  29.4  29.1   MCHC  34.2  34.3  33.9   RDW  15.2*  15.0  15.5*     Recent Labs   Lab  10/25/18   1355   10/27/18   0527  10/28/18   0519  10/29/18   0546  10/30/18   0616   BUN  74*   < >  76*  75*  7

## 2018-10-31 ENCOUNTER — TELEPHONE (OUTPATIENT)
Dept: NEPHROLOGY | Facility: CLINIC | Age: 23
End: 2018-10-31

## 2018-10-31 PROCEDURE — 99232 SBSQ HOSP IP/OBS MODERATE 35: CPT | Performed by: INTERNAL MEDICINE

## 2018-10-31 PROCEDURE — 99233 SBSQ HOSP IP/OBS HIGH 50: CPT | Performed by: HOSPITALIST

## 2018-10-31 RX ORDER — SEVELAMER CARBONATE 800 MG/1
800 TABLET, FILM COATED ORAL
Status: DISCONTINUED | OUTPATIENT
Start: 2018-11-01 | End: 2018-11-01

## 2018-10-31 NOTE — TELEPHONE ENCOUNTER
[employer] form received in [employer]. Logged for processing. [employer] packet emailed to pt '[email or web address]'.  GEREMIAS

## 2018-10-31 NOTE — PROGRESS NOTES
[facility]  Nephrology Daily Progress Note    Aletha Livingston  [id number]  24year old  Patient presents with:  Swelling Edema (cardiovascular, metabolic)      HPI:   Aletha Livingston is a 24year old female. Continues to slowly improve. Less bloated. edema.  Neurological: exam appropriate for age reflexes and motor skills appropriate for age    Labs:  CBC results over the last 6 months:  Recent Labs      10/31/18   0526   WBC  8.8   RBC  2.80*   HGB  8.2*   HCT  23.9*   PLT  103*   MPV  9.8   MCV  85.5 Complement C4, Serum      Magnesium      CBC With Differential With Platelet      Renal Function Panel      Renal Function Panel      CBC With Differential With Platelet      Magnesium      Renal Function Panel      CBC With Differential With Platelet

## 2018-10-31 NOTE — PROGRESS NOTES
[facility]    Progress Note    Kacey Velasquez Patient Status:  Inpatient    2/15/1995 MRN [id number]   Location [facility] 4W/SW/SE Attending Rosa Isela Erwin MD   Hosp Day # 6 PCP None Pcp       Subjective:     Feeling better. just on diuretic and diuresing well     Hyperkalemia  -resolved     HTN  Better  - cont metoprolol and norvasc     Sinus tachycardia  -better  - cont metoprolol     Other:  -HL  -anemia of chronic disease     dvt proph:   heparin    Code status:   Full

## 2018-10-31 NOTE — TELEPHONE ENCOUNTER
Short term disability forms given to Dr Macie Ramirez at the hospital. No [employer] consent signed or fee paid. Paperwork emailed to [employer].

## 2018-11-01 VITALS
RESPIRATION RATE: 16 BRPM | HEART RATE: 108 BPM | WEIGHT: 127.19 LBS | DIASTOLIC BLOOD PRESSURE: 93 MMHG | OXYGEN SATURATION: 99 % | BODY MASS INDEX: 25 KG/M2 | SYSTOLIC BLOOD PRESSURE: 128 MMHG | TEMPERATURE: 98 F

## 2018-11-01 PROCEDURE — 99232 SBSQ HOSP IP/OBS MODERATE 35: CPT | Performed by: INTERNAL MEDICINE

## 2018-11-01 PROCEDURE — 99239 HOSP IP/OBS DSCHRG MGMT >30: CPT | Performed by: HOSPITALIST

## 2018-11-01 RX ORDER — BUMETANIDE 2 MG/1
2 TABLET ORAL 2 TIMES DAILY
Qty: 60 TABLET | Refills: 1 | Status: SHIPPED | OUTPATIENT
Start: 2018-11-01 | End: 2018-11-08 | Stop reason: DRUGHIGH

## 2018-11-01 RX ORDER — PREDNISONE 20 MG/1
20 TABLET ORAL 2 TIMES DAILY WITH MEALS
Qty: 60 TABLET | Refills: 1 | Status: SHIPPED | OUTPATIENT
Start: 2018-11-01 | End: 2018-11-28

## 2018-11-01 RX ORDER — METOPROLOL TARTRATE 50 MG/1
50 TABLET, FILM COATED ORAL
Qty: 60 TABLET | Refills: 1 | Status: SHIPPED | OUTPATIENT
Start: 2018-11-01

## 2018-11-01 RX ORDER — AMLODIPINE BESYLATE 5 MG/1
5 TABLET ORAL DAILY
Qty: 30 TABLET | Refills: 0 | Status: SHIPPED | OUTPATIENT
Start: 2018-11-02 | End: 2018-11-28

## 2018-11-01 NOTE — PROGRESS NOTES
[facility]    Progress Note    Lynne Choe Patient Status:  Inpatient    2/15/1995 MRN [id number]   Location [facility] 4W/SW/SE Attending Millicent Maxwell MD   [facility] Day # 6 PCP None Pcp        Subjective:   Lynne Choe is CREATSERUM 1.86 (H) 10/31/2018    BUN 71 (H) 10/31/2018     10/31/2018    K 4.6 10/31/2018     10/31/2018    CO2 26 10/31/2018     (H) 10/31/2018    CA 7.4 (L) 10/31/2018    ALB 1.4 (L) 10/31/2018    ALKPHO 38 10/27/2018    BILT 0.5 10/2

## 2018-11-01 NOTE — PROGRESS NOTES
[facility]    Progress Note    Titi Gramajo Patient Status:  Inpatient    2/15/1995 MRN [id number]   Location [facility] 4W/SW/SE Attending Sean Elias MD   Hosp Day # 7 PCP None Pcp       Subjective:   Titi Gramajo is abnormal bruising noted  Back/Spine: no abnormalities noted  Musculoskeletal: full ROM all extremities good strength  no deformities  Extremities: 1+ edema  Neurological:  Grossly normal    Results:     Laboratory Data:  Lab Results   Component Value Date

## 2018-11-01 NOTE — DISCHARGE SUMMARY
[place] [facility]    Discharge Summary    Titi Gramajo Patient Status:  Inpatient    2/15/1995 MRN [id number]   Location [facility] 4W/SW/SE Attending Sean Elias,  [facility] Se Day # 7 PCP None Pcp     Date of Admission: 10/25/2018 similar presentation. Required IV Solu-Medrol and IV diuretics. Discharged home, but she continued to retain fluids and redeveloped edema and abdominal distention. Came back today to the emergency department for evaluation. Hemoglobin is 10.5.    tablet  Refills:  1     Metoprolol Tartrate 50 MG Tabs  Commonly known as:  LOPRESSOR      Take 1 tablet (50 mg total) by mouth 2x Daily(Beta Blocker).    Quantity:  60 tablet  Refills:  1        CHANGE how you take these medications      Instructions Presc MG Tabs  · Metoprolol Tartrate 50 MG Tabs  · predniSONE 20 MG Tabs         Follow up Visits: Follow-up Information     Danis Cota MD In 1 week.     Specialty:  NEPHROLOGY  Contact information:  [place]  [phone]

## 2018-11-05 ENCOUNTER — LAB ENCOUNTER (OUTPATIENT)
Dept: LAB | Facility: HOSPITAL | Age: 23
End: 2018-11-05
Attending: HOSPITALIST
Payer: COMMERCIAL

## 2018-11-05 ENCOUNTER — TELEPHONE (OUTPATIENT)
Dept: NEPHROLOGY | Facility: CLINIC | Age: 23
End: 2018-11-05

## 2018-11-05 DIAGNOSIS — N18.9 ACUTE RENAL FAILURE SUPERIMPOSED ON CHRONIC KIDNEY DISEASE, UNSPECIFIED CKD STAGE, UNSPECIFIED ACUTE RENAL FAILURE TYPE (HCC): ICD-10-CM

## 2018-11-05 DIAGNOSIS — N17.9 ACUTE RENAL FAILURE SUPERIMPOSED ON CHRONIC KIDNEY DISEASE, UNSPECIFIED CKD STAGE, UNSPECIFIED ACUTE RENAL FAILURE TYPE (HCC): ICD-10-CM

## 2018-11-05 DIAGNOSIS — M32.14 LUPUS NEPHRITIS (HCC): ICD-10-CM

## 2018-11-05 PROCEDURE — 80048 BASIC METABOLIC PNL TOTAL CA: CPT

## 2018-11-05 PROCEDURE — 36415 COLL VENOUS BLD VENIPUNCTURE: CPT

## 2018-11-05 PROCEDURE — 85025 COMPLETE CBC W/AUTO DIFF WBC: CPT

## 2018-11-05 NOTE — TELEPHONE ENCOUNTER
Called for pt on cell# and this is her mother's phone#.  She gave me another phone# to reach pt ([phone]) Pt contacted and appt booked for Wed 11/7/18 at 2:40 pm.

## 2018-11-05 NOTE — PAYOR COMM NOTE
--------------  DISCHARGE REVIEW    Payor: [address]  Subscriber #:  [id number]  Authorization Number: [id number]    Admit date: 10/25/18  Admit time:  1803  Discharge Date: 11/1/2018  5:03 PM     Admitting Physician: Giovany Lundebrg MD  Att Exam:      BP (!) 128/93 (BP Location: Right arm)   Pulse 108   Temp 97.9 °F (36.6 °C) (Oral)   Resp 16   Wt 127 lb 3.2 oz (57.7 kg)   LMP 10/08/2018   SpO2 99%   BMI 24.84 kg/m²        Gen:   NAD.   A and O x 3  CV:   RRR, no m/g/r  Pulm:   CTA bilat  Abd: received albumin with diuretic.    Now just on diuretic and diuresing well     Hyperkalemia  -resolved     HTN  Better  - cont metoprolol and norvasc  Rx for metoprolol at home.     Sinus tachycardia  - better  - cont metoprolol     Other:  - HL  - anemia o (800 mg total) by mouth 3 (three) times daily with meals. Quantity:  90 tablet  Refills:  0     simvastatin 20 MG Tabs  Commonly known as:  ZOCOR      Take 20 mg by mouth nightly.    Refills:  0     Sulfamethoxazole-TMP -160 MG Tabs per tablet  Comm

## 2018-11-05 NOTE — TELEPHONE ENCOUNTER
There is no release of information on file and HIPPA laws prevent anyone from speaking to anyone other than patient. Patient has an appointment with Dr. Isabella Morales on Wednesday 11/7/18. She can sign Release then if she wants us to speak to her mother.

## 2018-11-07 ENCOUNTER — OFFICE VISIT (OUTPATIENT)
Dept: NEPHROLOGY | Facility: CLINIC | Age: 23
End: 2018-11-07
Payer: COMMERCIAL

## 2018-11-07 VITALS
BODY MASS INDEX: 21.91 KG/M2 | HEART RATE: 100 BPM | SYSTOLIC BLOOD PRESSURE: 121 MMHG | DIASTOLIC BLOOD PRESSURE: 84 MMHG | WEIGHT: 111.63 LBS | HEIGHT: 60 IN

## 2018-11-07 DIAGNOSIS — N18.9 ACUTE RENAL FAILURE SUPERIMPOSED ON CHRONIC KIDNEY DISEASE, UNSPECIFIED CKD STAGE, UNSPECIFIED ACUTE RENAL FAILURE TYPE (HCC): ICD-10-CM

## 2018-11-07 DIAGNOSIS — N17.9 ACUTE RENAL FAILURE SUPERIMPOSED ON CHRONIC KIDNEY DISEASE, UNSPECIFIED CKD STAGE, UNSPECIFIED ACUTE RENAL FAILURE TYPE (HCC): ICD-10-CM

## 2018-11-07 DIAGNOSIS — M32.14 LUPUS NEPHRITIS (HCC): Primary | ICD-10-CM

## 2018-11-07 PROCEDURE — 99212 OFFICE O/P EST SF 10 MIN: CPT | Performed by: INTERNAL MEDICINE

## 2018-11-07 PROCEDURE — 99214 OFFICE O/P EST MOD 30 MIN: CPT | Performed by: INTERNAL MEDICINE

## 2018-11-07 NOTE — TELEPHONE ENCOUNTER
Pt dropped signed PEPITO and pd fee- emailed to [address] in folder at [facility]

## 2018-11-08 ENCOUNTER — TELEPHONE (OUTPATIENT)
Dept: NEPHROLOGY | Facility: CLINIC | Age: 23
End: 2018-11-08

## 2018-11-08 RX ORDER — BUMETANIDE 1 MG/1
1 TABLET ORAL 2 TIMES DAILY
Qty: 60 TABLET | Refills: 2 | Status: SHIPPED | OUTPATIENT
Start: 2018-11-08 | End: 2018-11-16

## 2018-11-08 NOTE — TELEPHONE ENCOUNTER
Pt mother states pt weight went down since her last office visit yesterday weighing in at 111 and now she is at 105 as of this morning 11/8/18

## 2018-11-08 NOTE — TELEPHONE ENCOUNTER
Spoke to patient's mother (caller) Dr. Rose Foot advice relayed to hold Bumex for 2 days then resume at 1 mg 2 times every day. Med list updated. New prescription e-scripted to [place].  Mom is aware to contact Dr. Alyssa Ramirez if patient's weight either thomas

## 2018-11-08 NOTE — PROGRESS NOTES
[address] [facility]  Nephrology Daily Progress Note    Justin Braun  [id number]  24year old  Patient presents with:  Test Results      HPI:   Justin Braun is a 24year old female.   44-year-old -American female with a history of systemic lupus payton gave her a second dose of Cytoxan October 26, 2018. Tolerated well except she started to note significant hair loss when she returned home. Discharged home on Bumex 2 mg twice daily and metolazone 5 mg daily.   Her weight has decreased significantly since gallups, or rubs  Abdomen: soft non-tender non-distended no organomegaly noted no masses  Musculoskeletal: full ROM all extremities good strength  no deformities  Extremities: Trace edema.   Neurological: exam appropriate for age reflexes and motor skills a -160 MG Oral Tab per tablet, Take 1 tablet by mouth daily.  , Disp: , Rfl:   •  simvastatin 20 MG Oral Tab, Take 20 mg by mouth nightly., Disp: , Rfl:     Allergies:    Mycophenolate           UNKNOWN         ASSESSMENT/PLAN:   Assessment   Lupus neph family understand that Cytoxan unfortunately can cause infertility. However she did not tolerate CellCept and is agreeable to continue Cytoxan as she had a good response before.   Again biopsy done on October 5, 2008 showed class IV and class V lupus nephr

## 2018-11-08 NOTE — TELEPHONE ENCOUNTER
Hold Bumex x 2 days. Then resume at a lower dose. 1 mg bid.   Call if weight continues to drop or increases significantly

## 2018-11-08 NOTE — PATIENT INSTRUCTIONS
Stop your metolazone. Weigh yourself daily and let me know if your weight should increase or decrease by 3-4 pounds. We will call you once we get your Cytoxan infusion set up.

## 2018-11-09 ENCOUNTER — OFFICE VISIT (OUTPATIENT)
Dept: RHEUMATOLOGY | Facility: CLINIC | Age: 23
End: 2018-11-09
Payer: COMMERCIAL

## 2018-11-09 VITALS
BODY MASS INDEX: 21.52 KG/M2 | SYSTOLIC BLOOD PRESSURE: 141 MMHG | DIASTOLIC BLOOD PRESSURE: 102 MMHG | HEIGHT: 60 IN | WEIGHT: 109.63 LBS | HEART RATE: 92 BPM

## 2018-11-09 DIAGNOSIS — M32.14 SYSTEMIC LUPUS ERYTHEMATOSUS WITH GLOMERULAR DISEASE, UNSPECIFIED SLE TYPE (HCC): Primary | ICD-10-CM

## 2018-11-09 DIAGNOSIS — Z51.81 THERAPEUTIC DRUG MONITORING: ICD-10-CM

## 2018-11-09 PROCEDURE — 99212 OFFICE O/P EST SF 10 MIN: CPT | Performed by: INTERNAL MEDICINE

## 2018-11-09 PROCEDURE — 99214 OFFICE O/P EST MOD 30 MIN: CPT | Performed by: INTERNAL MEDICINE

## 2018-11-09 NOTE — PROGRESS NOTES
.  Kenneth Downey is a 24year old female. HPI:   Patient presents with:  Lupus    I had the pleasure of seeing Kenneth Downey on 11/9/2018 for hospital follow up. She was hosptilized 10/13-10/19/2018 .  She was readmitted on 10/25/2018 to 11/1/201 She had joint pain since 2012 with wrist pain. She has been getting n/v yesterday - today she is ok. She has had acne for all of 10/2018. She is on prednisone 20mg bid. She is on cytoxen 500mg iv every 2 weeks - her last dose was g10/26.  She is due Sulfamethoxazole-TMP -160 MG Oral Tab per tablet Take 1 tablet by mouth daily. Disp:  Rfl:    simvastatin 20 MG Oral Tab Take 20 mg by mouth nightly.  Disp:  Rfl:      .cmed  Allergies:    Mycophenolate           UNKNOWN      ROS:   Review Of Systems CREATININE      0.50 - 1.50 mg/dL 1.96 (H) 1.78 (H)    CALCIUM      8.5 - 10.5 mg/dL 7.7 (L) 7.6 (L)    Albumin      3.5 - 4.8 g/dL  1.4 (L)    PHOSPHORUS      2.4 - 4.7 mg/dL  5.4 (H)    BUN/CREA RATIO      10.0 - 20.0 27.0 (H) 37.6 (H)    ANION GAP S/p kidney bx on 10/5/2018, joint  Pain   - recivieing cytoxen every 2 weeks - for 4 more doses - she has follow up with dr. Paula Pop, nephrology. - cont.  hcq 200mg bid  Discussed risks and benefits of medication with patient , including retinal toxicity

## 2018-11-09 NOTE — PATIENT INSTRUCTIONS
1. Cont hydroyxchlrouqine 200mg twice a day   2. Cont. Prednisone 20mg twice a day -   3. Cont. Cytoxan treatments   4. Return to clinic in 3 months.

## 2018-11-09 NOTE — TELEPHONE ENCOUNTER
Dr. Philomena Barfield form: Pt has been off work since 10/3/18 til pending her next office visit with you around 12/7/18. Please sign off on form if you approve:  -Highlight the patient and hit \"Chart\" button.   -In Chart Review, w/in the [facility]

## 2018-11-12 ENCOUNTER — LAB ENCOUNTER (OUTPATIENT)
Dept: LAB | Facility: HOSPITAL | Age: 23
End: 2018-11-12
Attending: INTERNAL MEDICINE
Payer: COMMERCIAL

## 2018-11-12 DIAGNOSIS — N18.9 ACUTE RENAL FAILURE SUPERIMPOSED ON CHRONIC KIDNEY DISEASE, UNSPECIFIED CKD STAGE, UNSPECIFIED ACUTE RENAL FAILURE TYPE (HCC): ICD-10-CM

## 2018-11-12 DIAGNOSIS — M32.14 LUPUS NEPHRITIS (HCC): ICD-10-CM

## 2018-11-12 DIAGNOSIS — N17.9 ACUTE RENAL FAILURE SUPERIMPOSED ON CHRONIC KIDNEY DISEASE, UNSPECIFIED CKD STAGE, UNSPECIFIED ACUTE RENAL FAILURE TYPE (HCC): ICD-10-CM

## 2018-11-12 PROCEDURE — 85025 COMPLETE CBC W/AUTO DIFF WBC: CPT

## 2018-11-12 PROCEDURE — 36415 COLL VENOUS BLD VENIPUNCTURE: CPT

## 2018-11-12 PROCEDURE — 82570 ASSAY OF URINE CREATININE: CPT

## 2018-11-12 PROCEDURE — 85027 COMPLETE CBC AUTOMATED: CPT

## 2018-11-12 PROCEDURE — 86140 C-REACTIVE PROTEIN: CPT

## 2018-11-12 PROCEDURE — 85652 RBC SED RATE AUTOMATED: CPT

## 2018-11-12 PROCEDURE — 82043 UR ALBUMIN QUANTITATIVE: CPT

## 2018-11-12 PROCEDURE — 81001 URINALYSIS AUTO W/SCOPE: CPT

## 2018-11-12 PROCEDURE — 80069 RENAL FUNCTION PANEL: CPT

## 2018-11-12 PROCEDURE — 85007 BL SMEAR W/DIFF WBC COUNT: CPT

## 2018-11-13 ENCOUNTER — OFFICE VISIT (OUTPATIENT)
Dept: HEMATOLOGY/ONCOLOGY | Facility: HOSPITAL | Age: 23
End: 2018-11-13
Attending: INTERNAL MEDICINE
Payer: COMMERCIAL

## 2018-11-13 VITALS
HEART RATE: 89 BPM | WEIGHT: 114.38 LBS | DIASTOLIC BLOOD PRESSURE: 94 MMHG | TEMPERATURE: 98 F | BODY MASS INDEX: 22 KG/M2 | SYSTOLIC BLOOD PRESSURE: 136 MMHG | RESPIRATION RATE: 16 BRPM

## 2018-11-13 DIAGNOSIS — M32.14 LUPUS NEPHRITIS (HCC): Primary | ICD-10-CM

## 2018-11-13 PROCEDURE — 96413 CHEMO IV INFUSION 1 HR: CPT

## 2018-11-13 PROCEDURE — A4216 STERILE WATER/SALINE, 10 ML: HCPCS

## 2018-11-13 PROCEDURE — 96375 TX/PRO/DX INJ NEW DRUG ADDON: CPT

## 2018-11-13 RX ORDER — 0.9 % SODIUM CHLORIDE 0.9 %
VIAL (ML) INJECTION
Status: DISCONTINUED
Start: 2018-11-13 | End: 2018-11-13

## 2018-11-13 RX ORDER — DIPHENHYDRAMINE HCL 50 MG
50 CAPSULE ORAL ONCE
Status: DISCONTINUED | OUTPATIENT
Start: 2018-11-13 | End: 2018-11-13

## 2018-11-13 RX ORDER — ONDANSETRON 2 MG/ML
8 INJECTION INTRAMUSCULAR; INTRAVENOUS ONCE
Status: COMPLETED | OUTPATIENT
Start: 2018-11-13 | End: 2018-11-13

## 2018-11-13 RX ORDER — DIPHENHYDRAMINE HCL 50 MG
50 CAPSULE ORAL ONCE
Status: CANCELLED
Start: 2018-11-13 | End: 2018-11-13

## 2018-11-13 RX ORDER — ONDANSETRON 2 MG/ML
8 INJECTION INTRAMUSCULAR; INTRAVENOUS ONCE
Status: CANCELLED
Start: 2018-11-13 | End: 2018-11-13

## 2018-11-13 RX ORDER — SODIUM CHLORIDE 9 MG/ML
INJECTION, SOLUTION INTRAVENOUS
Status: DISCONTINUED
Start: 2018-11-13 | End: 2018-11-13

## 2018-11-13 RX ORDER — DIPHENHYDRAMINE HYDROCHLORIDE 50 MG/ML
50 INJECTION INTRAMUSCULAR; INTRAVENOUS ONCE
Status: CANCELLED
Start: 2018-11-13 | End: 2018-11-13

## 2018-11-13 RX ORDER — DIPHENHYDRAMINE HCL 50 MG
CAPSULE ORAL
Status: DISCONTINUED
Start: 2018-11-13 | End: 2018-11-13

## 2018-11-13 RX ORDER — ONDANSETRON 2 MG/ML
INJECTION INTRAMUSCULAR; INTRAVENOUS
Status: COMPLETED
Start: 2018-11-13 | End: 2018-11-13

## 2018-11-13 RX ADMIN — ONDANSETRON 8 MG: 2 INJECTION INTRAMUSCULAR; INTRAVENOUS at 08:14:00

## 2018-11-13 NOTE — PROGRESS NOTES
Pt to infusion today for 1 of 4 IV Cytoxan to treat lupus nephritis. Pt states she is doing well and denies any fevers or flu like symptoms. Pt reports fatigue and some pain in her joints.   Pt refused benadryl, states she has had cytoxan in the past and

## 2018-11-14 NOTE — TELEPHONE ENCOUNTER
Dr. Royal Kearney,    Please sign off on the disability form if you approve.     Thank you,  Belinda Gillespie

## 2018-11-15 ENCOUNTER — TELEPHONE (OUTPATIENT)
Dept: NEPHROLOGY | Facility: CLINIC | Age: 23
End: 2018-11-15

## 2018-11-15 NOTE — TELEPHONE ENCOUNTER
Contacted pt's mother (consent on file). Notified her of MKK's message below. She states Antavia did very well with infusion; no issues. She felt great. Appetite is good. She is getting better & looking better.  Weight is stable at 112 lbs; not retaining fl

## 2018-11-15 NOTE — TELEPHONE ENCOUNTER
Kidney function is better and other labs look stable. Did she do okay with the Cytoxan infusion 2 days ago? She was also having some spasms and twitching. Have those symptoms resolved?   If doing well just repeat her labs 2 or 3 days before her next Cyto

## 2018-11-15 NOTE — TELEPHONE ENCOUNTER
Cuca Samaniego was wondering if pt is ok to take Vitamins/Supplements in addition to the medications she is already taking. Pls call - aware MKK is not in office. Thank you.

## 2018-11-16 RX ORDER — BUMETANIDE 1 MG/1
0.5 TABLET ORAL EVERY OTHER DAY
Qty: 15 TABLET | Refills: 2 | COMMUNITY
Start: 2018-11-16

## 2018-11-16 NOTE — TELEPHONE ENCOUNTER
OK for those vitamins in standard amounts. I have her down for Bumex 1 mg bid not qd.   If so decrease to 1 mg qam.

## 2018-11-16 NOTE — TELEPHONE ENCOUNTER
Clarified with MKK; pt doesn't have any dietary restrictions other than following a low sodium diet. Contacted mother.  Notified her that [address] [place] said it was fine to take the vitamins she asked about in standard amounts, decrease Bumex to 1 mg every other da

## 2018-11-16 NOTE — TELEPHONE ENCOUNTER
Patient's mother states that her Bumex was decreased from 1 mg BID to 1 mg once daily. I couldn't find this documentation, but she's down to taking just 1 mg once daily.

## 2018-11-16 NOTE — TELEPHONE ENCOUNTER
Spoke to mother (consent on file). She is asking if Carlos Enrique can take Biotin, Vitamin D, Iron , Vitamin C, B12 complex, and Fish oil. She is not currently taking; waiting for okay. Pt also wondering if she needs to follow any food or dietary restrictions?  C

## 2018-11-26 ENCOUNTER — LAB ENCOUNTER (OUTPATIENT)
Dept: LAB | Facility: HOSPITAL | Age: 23
End: 2018-11-26
Attending: INTERNAL MEDICINE
Payer: COMMERCIAL

## 2018-11-26 DIAGNOSIS — N17.9 ACUTE RENAL FAILURE SUPERIMPOSED ON CHRONIC KIDNEY DISEASE, UNSPECIFIED CKD STAGE, UNSPECIFIED ACUTE RENAL FAILURE TYPE (HCC): ICD-10-CM

## 2018-11-26 DIAGNOSIS — N18.9 ACUTE RENAL FAILURE SUPERIMPOSED ON CHRONIC KIDNEY DISEASE, UNSPECIFIED CKD STAGE, UNSPECIFIED ACUTE RENAL FAILURE TYPE (HCC): ICD-10-CM

## 2018-11-26 DIAGNOSIS — M32.14 LUPUS NEPHRITIS (HCC): ICD-10-CM

## 2018-11-26 PROCEDURE — 85007 BL SMEAR W/DIFF WBC COUNT: CPT

## 2018-11-26 PROCEDURE — 82570 ASSAY OF URINE CREATININE: CPT

## 2018-11-26 PROCEDURE — 85652 RBC SED RATE AUTOMATED: CPT

## 2018-11-26 PROCEDURE — 85025 COMPLETE CBC W/AUTO DIFF WBC: CPT

## 2018-11-26 PROCEDURE — 85027 COMPLETE CBC AUTOMATED: CPT

## 2018-11-26 PROCEDURE — 82043 UR ALBUMIN QUANTITATIVE: CPT

## 2018-11-26 PROCEDURE — 86140 C-REACTIVE PROTEIN: CPT

## 2018-11-26 PROCEDURE — 81001 URINALYSIS AUTO W/SCOPE: CPT

## 2018-11-26 PROCEDURE — 80069 RENAL FUNCTION PANEL: CPT

## 2018-11-26 PROCEDURE — 36415 COLL VENOUS BLD VENIPUNCTURE: CPT

## 2018-11-27 ENCOUNTER — TELEPHONE (OUTPATIENT)
Dept: NEPHROLOGY | Facility: CLINIC | Age: 23
End: 2018-11-27

## 2018-11-27 ENCOUNTER — OFFICE VISIT (OUTPATIENT)
Dept: HEMATOLOGY/ONCOLOGY | Facility: HOSPITAL | Age: 23
End: 2018-11-27
Attending: INTERNAL MEDICINE
Payer: COMMERCIAL

## 2018-11-27 VITALS
HEART RATE: 94 BPM | DIASTOLIC BLOOD PRESSURE: 95 MMHG | RESPIRATION RATE: 16 BRPM | SYSTOLIC BLOOD PRESSURE: 131 MMHG | TEMPERATURE: 98 F

## 2018-11-27 DIAGNOSIS — M32.14 LUPUS NEPHRITIS (HCC): Primary | ICD-10-CM

## 2018-11-27 PROCEDURE — 96375 TX/PRO/DX INJ NEW DRUG ADDON: CPT

## 2018-11-27 PROCEDURE — A4216 STERILE WATER/SALINE, 10 ML: HCPCS

## 2018-11-27 PROCEDURE — 96413 CHEMO IV INFUSION 1 HR: CPT

## 2018-11-27 RX ORDER — DIPHENHYDRAMINE HCL 50 MG
50 CAPSULE ORAL ONCE
Status: DISCONTINUED | OUTPATIENT
Start: 2018-11-27 | End: 2018-11-27

## 2018-11-27 RX ORDER — 0.9 % SODIUM CHLORIDE 0.9 %
VIAL (ML) INJECTION
Status: DISCONTINUED
Start: 2018-11-27 | End: 2018-11-27

## 2018-11-27 RX ORDER — ONDANSETRON 2 MG/ML
INJECTION INTRAMUSCULAR; INTRAVENOUS
Status: COMPLETED
Start: 2018-11-27 | End: 2018-11-27

## 2018-11-27 RX ORDER — ONDANSETRON 2 MG/ML
8 INJECTION INTRAMUSCULAR; INTRAVENOUS ONCE
Status: CANCELLED
Start: 2018-11-27 | End: 2018-11-27

## 2018-11-27 RX ORDER — DIPHENHYDRAMINE HYDROCHLORIDE 50 MG/ML
50 INJECTION INTRAMUSCULAR; INTRAVENOUS ONCE
Status: DISCONTINUED | OUTPATIENT
Start: 2018-11-27 | End: 2018-11-27

## 2018-11-27 RX ORDER — DIPHENHYDRAMINE HCL 50 MG
50 CAPSULE ORAL ONCE
Status: CANCELLED
Start: 2018-11-27 | End: 2018-11-27

## 2018-11-27 RX ORDER — DIPHENHYDRAMINE HYDROCHLORIDE 50 MG/ML
50 INJECTION INTRAMUSCULAR; INTRAVENOUS ONCE
Status: CANCELLED
Start: 2018-11-27 | End: 2018-11-27

## 2018-11-27 RX ORDER — SODIUM CHLORIDE 9 MG/ML
INJECTION, SOLUTION INTRAVENOUS
Status: DISCONTINUED
Start: 2018-11-27 | End: 2018-11-27

## 2018-11-27 RX ORDER — ONDANSETRON 2 MG/ML
8 INJECTION INTRAMUSCULAR; INTRAVENOUS ONCE
Status: COMPLETED | OUTPATIENT
Start: 2018-11-27 | End: 2018-11-27

## 2018-11-27 RX ORDER — DIPHENHYDRAMINE HCL 50 MG
CAPSULE ORAL
Status: DISCONTINUED
Start: 2018-11-27 | End: 2018-11-27

## 2018-11-27 RX ADMIN — ONDANSETRON 8 MG: 2 INJECTION INTRAMUSCULAR; INTRAVENOUS at 08:04:00

## 2018-11-27 NOTE — PROGRESS NOTES
Pt to infusion today for 2 of 4 IV Cytoxan to treat lupus nephritis. Her step-father is with her today. Pt states she is doing better, \"I have more energy lately\" and denies any fevers or flu like symptoms.     Pt reports less fatigue and less pain in h

## 2018-11-28 RX ORDER — SIMVASTATIN 20 MG
20 TABLET ORAL NIGHTLY
Qty: 30 TABLET | Refills: 2 | Status: SHIPPED | OUTPATIENT
Start: 2018-11-28

## 2018-11-28 RX ORDER — SULFAMETHOXAZOLE AND TRIMETHOPRIM 800; 160 MG/1; MG/1
1 TABLET ORAL DAILY
Qty: 30 TABLET | Refills: 2 | Status: SHIPPED | OUTPATIENT
Start: 2018-11-28 | End: 2019-01-09 | Stop reason: ALTCHOICE

## 2018-11-28 RX ORDER — AMLODIPINE BESYLATE 5 MG/1
5 TABLET ORAL DAILY
Qty: 30 TABLET | Refills: 2 | Status: SHIPPED | OUTPATIENT
Start: 2018-11-28 | End: 2019-01-09

## 2018-11-28 RX ORDER — PREDNISONE 20 MG/1
20 TABLET ORAL 2 TIMES DAILY WITH MEALS
Qty: 60 TABLET | Refills: 2 | Status: SHIPPED | OUTPATIENT
Start: 2018-11-28 | End: 2019-01-09

## 2018-11-28 RX ORDER — HYDROXYCHLOROQUINE SULFATE 200 MG/1
200 TABLET, FILM COATED ORAL 2 TIMES DAILY
Qty: 60 TABLET | Refills: 2 | Status: SHIPPED | OUTPATIENT
Start: 2018-11-28

## 2018-11-28 RX ORDER — SEVELAMER CARBONATE 800 MG/1
800 TABLET, FILM COATED ORAL
Qty: 90 TABLET | Refills: 2 | Status: SHIPPED | OUTPATIENT
Start: 2018-11-28 | End: 2019-01-09 | Stop reason: ALTCHOICE

## 2018-11-28 NOTE — TELEPHONE ENCOUNTER
Spoke to mom, Bhargav Howard (consent on file). Notified her of MKK's message below. Advised to repeat labs in 2 weeks before next infusion. She states pt's edema is under control.  She is out of medications and needs refills for Renvela, Bactrim, amlodipine, Alex

## 2018-11-28 NOTE — TELEPHONE ENCOUNTER
Pts mother called again about results. She would also like to know if she should continue on all meds based on results. Pt is pretty much out of medication. Please call.

## 2018-11-28 NOTE — TELEPHONE ENCOUNTER
Kidney function continues to improve. Still with protein in urine but may be a little less. Other labs stable. Is edema under control?   If so CPM. Labs in 2 wks before next infusion

## 2018-11-30 ENCOUNTER — TELEPHONE (OUTPATIENT)
Dept: NEPHROLOGY | Facility: CLINIC | Age: 23
End: 2018-11-30

## 2018-11-30 NOTE — TELEPHONE ENCOUNTER
Received notification from [employer] that PA is required for Renvela. PA submitted on [employer]. Pending.

## 2018-11-30 NOTE — TELEPHONE ENCOUNTER
Fax received from Rosie MCLAUGHLIN is pending however states initial review indicates this PA may be denied.  Goes on to state if Dr. Ariella Shen would like to call and request a Peer to peer prior to a decision being made he may do so by calling [phone]

## 2018-11-30 NOTE — TELEPHONE ENCOUNTER
Received fax from [employer] that additional information is needed for PA for Renvela. 1). Is the patient at risk if therapy is changed? 2).  Does the patient have a documented intolerance, FDA labeled contraindication, or hypersensitivity to a calcium-con

## 2018-12-04 ENCOUNTER — TELEPHONE (OUTPATIENT)
Dept: NEPHROLOGY | Facility: CLINIC | Age: 23
End: 2018-12-04

## 2018-12-04 NOTE — TELEPHONE ENCOUNTER
Contacted mother. Notified her of KAREN's message below. Scheduled appt with KAREN for 12/10/18 and added to waitlist for sooner appt if it becomes available.

## 2018-12-04 NOTE — TELEPHONE ENCOUNTER
Spoke to mother (consent on file). She states pt started having itching on her face about 3 days ago. Denies new medications. She has been taking Benadryl, which is providing relief, but she's having to take it too much. Face is still plump from lupus.  She

## 2018-12-04 NOTE — TELEPHONE ENCOUNTER
Mother states for the past few days pt has been itching all over her face. Mother inquiring if there is anything else pt can take besides benadryl.   Please call thank you [phone]

## 2018-12-07 ENCOUNTER — LAB ENCOUNTER (OUTPATIENT)
Dept: LAB | Facility: HOSPITAL | Age: 23
End: 2018-12-07
Attending: INTERNAL MEDICINE
Payer: COMMERCIAL

## 2018-12-07 DIAGNOSIS — N17.9 ACUTE RENAL FAILURE SUPERIMPOSED ON CHRONIC KIDNEY DISEASE, UNSPECIFIED CKD STAGE, UNSPECIFIED ACUTE RENAL FAILURE TYPE (HCC): ICD-10-CM

## 2018-12-07 DIAGNOSIS — M32.14 LUPUS NEPHRITIS (HCC): ICD-10-CM

## 2018-12-07 DIAGNOSIS — N18.9 ACUTE RENAL FAILURE SUPERIMPOSED ON CHRONIC KIDNEY DISEASE, UNSPECIFIED CKD STAGE, UNSPECIFIED ACUTE RENAL FAILURE TYPE (HCC): ICD-10-CM

## 2018-12-07 PROCEDURE — 36415 COLL VENOUS BLD VENIPUNCTURE: CPT

## 2018-12-07 PROCEDURE — 85652 RBC SED RATE AUTOMATED: CPT

## 2018-12-07 PROCEDURE — 82043 UR ALBUMIN QUANTITATIVE: CPT

## 2018-12-07 PROCEDURE — 82570 ASSAY OF URINE CREATININE: CPT

## 2018-12-07 PROCEDURE — 85025 COMPLETE CBC W/AUTO DIFF WBC: CPT

## 2018-12-07 PROCEDURE — 81001 URINALYSIS AUTO W/SCOPE: CPT

## 2018-12-07 PROCEDURE — 80069 RENAL FUNCTION PANEL: CPT

## 2018-12-07 PROCEDURE — 86140 C-REACTIVE PROTEIN: CPT

## 2018-12-10 ENCOUNTER — OFFICE VISIT (OUTPATIENT)
Dept: NEPHROLOGY | Facility: CLINIC | Age: 23
End: 2018-12-10
Payer: COMMERCIAL

## 2018-12-10 VITALS
BODY MASS INDEX: 20.28 KG/M2 | WEIGHT: 110.19 LBS | SYSTOLIC BLOOD PRESSURE: 128 MMHG | HEART RATE: 93 BPM | DIASTOLIC BLOOD PRESSURE: 85 MMHG | HEIGHT: 62 IN

## 2018-12-10 DIAGNOSIS — R21 RASH: ICD-10-CM

## 2018-12-10 DIAGNOSIS — N18.9 ACUTE RENAL FAILURE SUPERIMPOSED ON CHRONIC KIDNEY DISEASE, UNSPECIFIED CKD STAGE, UNSPECIFIED ACUTE RENAL FAILURE TYPE (HCC): ICD-10-CM

## 2018-12-10 DIAGNOSIS — M32.14 LUPUS NEPHRITIS (HCC): Primary | ICD-10-CM

## 2018-12-10 DIAGNOSIS — N17.9 ACUTE RENAL FAILURE SUPERIMPOSED ON CHRONIC KIDNEY DISEASE, UNSPECIFIED CKD STAGE, UNSPECIFIED ACUTE RENAL FAILURE TYPE (HCC): ICD-10-CM

## 2018-12-10 PROCEDURE — 99212 OFFICE O/P EST SF 10 MIN: CPT | Performed by: INTERNAL MEDICINE

## 2018-12-10 PROCEDURE — 99214 OFFICE O/P EST MOD 30 MIN: CPT | Performed by: INTERNAL MEDICINE

## 2018-12-10 RX ORDER — ERYTHROMYCIN AND BENZOYL PEROXIDE 30; 50 MG/G; MG/G
1 GEL TOPICAL 2 TIMES DAILY
Qty: 46.6 G | Refills: 2 | Status: SHIPPED | OUTPATIENT
Start: 2018-12-10

## 2018-12-11 ENCOUNTER — OFFICE VISIT (OUTPATIENT)
Dept: HEMATOLOGY/ONCOLOGY | Facility: HOSPITAL | Age: 23
End: 2018-12-11
Attending: INTERNAL MEDICINE
Payer: COMMERCIAL

## 2018-12-11 VITALS
BODY MASS INDEX: 20 KG/M2 | HEART RATE: 98 BPM | RESPIRATION RATE: 16 BRPM | WEIGHT: 111 LBS | TEMPERATURE: 99 F | SYSTOLIC BLOOD PRESSURE: 124 MMHG | DIASTOLIC BLOOD PRESSURE: 79 MMHG

## 2018-12-11 DIAGNOSIS — M32.14 LUPUS NEPHRITIS (HCC): Primary | ICD-10-CM

## 2018-12-11 PROCEDURE — A4216 STERILE WATER/SALINE, 10 ML: HCPCS

## 2018-12-11 PROCEDURE — 96413 CHEMO IV INFUSION 1 HR: CPT

## 2018-12-11 PROCEDURE — 96375 TX/PRO/DX INJ NEW DRUG ADDON: CPT

## 2018-12-11 RX ORDER — 0.9 % SODIUM CHLORIDE 0.9 %
VIAL (ML) INJECTION
Status: DISCONTINUED
Start: 2018-12-11 | End: 2018-12-11

## 2018-12-11 RX ORDER — ONDANSETRON 2 MG/ML
INJECTION INTRAMUSCULAR; INTRAVENOUS
Status: COMPLETED
Start: 2018-12-11 | End: 2018-12-11

## 2018-12-11 RX ORDER — DIPHENHYDRAMINE HCL 50 MG
50 CAPSULE ORAL ONCE
Status: CANCELLED
Start: 2018-12-11 | End: 2018-12-11

## 2018-12-11 RX ORDER — DIPHENHYDRAMINE HYDROCHLORIDE 50 MG/ML
50 INJECTION INTRAMUSCULAR; INTRAVENOUS ONCE
Status: CANCELLED
Start: 2018-12-11 | End: 2018-12-11

## 2018-12-11 RX ORDER — ONDANSETRON 2 MG/ML
8 INJECTION INTRAMUSCULAR; INTRAVENOUS ONCE
Status: COMPLETED | OUTPATIENT
Start: 2018-12-11 | End: 2018-12-11

## 2018-12-11 RX ORDER — ONDANSETRON 2 MG/ML
8 INJECTION INTRAMUSCULAR; INTRAVENOUS ONCE
Status: CANCELLED
Start: 2018-12-11 | End: 2018-12-11

## 2018-12-11 RX ORDER — SODIUM CHLORIDE 9 MG/ML
INJECTION, SOLUTION INTRAVENOUS
Status: DISCONTINUED
Start: 2018-12-11 | End: 2018-12-11

## 2018-12-11 RX ADMIN — ONDANSETRON 8 MG: 2 INJECTION INTRAMUSCULAR; INTRAVENOUS at 07:55:00

## 2018-12-11 NOTE — PATIENT INSTRUCTIONS
Repeat your laboratory studies a day or 2 before your final dose of Cytoxan. Try the Benzamycin gel but if it is not helping then see dermatology.

## 2018-12-11 NOTE — PROGRESS NOTES
Pt to infusion today for 3 of 4 IV Cytoxan to treat lupus nephritis. Pt states she is doing well and denies any fevers or flu like symptoms. Pt reports fatigue and some pain in her joints. Pt has random tremors.   Pt refused benadryl, states she has had

## 2018-12-11 NOTE — PROGRESS NOTES
[facility]  Nephrology Daily Progress Note    Diane Chauhan  [id number]  24year old  Patient presents with:  Itching: c/o itching to face x several weeks. HPI:   Diane Chauhan is a 24year old female.   29-year-old -American female Weight 109 lbs 10 oz 114 lbs 6 oz 110 lbs 3 oz       Constitutional: appears well hydrated alert and responsive no acute distress noted  Neck/Thyroid: neck is supple without adenopathy  Lymphatic: no abnormal cervical, supraclavicular or axillary adenopa 30 tablet, Rfl: 2  •  simvastatin 20 MG Oral Tab, Take 1 tablet (20 mg total) by mouth nightly., Disp: 30 tablet, Rfl: 2  •  Sevelamer Carbonate 800 MG Oral Tab, Take 1 tablet (800 mg total) by mouth 3 (three) times daily with meals. , Disp: 90 tablet, Rfl: mother. No orders of the defined types were placed in this encounter.       12/10/2018  Teressa Vergara MD

## 2018-12-17 ENCOUNTER — TELEPHONE (OUTPATIENT)
Dept: RHEUMATOLOGY | Facility: CLINIC | Age: 23
End: 2018-12-17

## 2018-12-17 ENCOUNTER — OFFICE VISIT (OUTPATIENT)
Dept: RHEUMATOLOGY | Facility: CLINIC | Age: 23
End: 2018-12-17
Payer: COMMERCIAL

## 2018-12-17 VITALS
SYSTOLIC BLOOD PRESSURE: 135 MMHG | DIASTOLIC BLOOD PRESSURE: 92 MMHG | HEART RATE: 118 BPM | HEIGHT: 62 IN | BODY MASS INDEX: 21.53 KG/M2 | WEIGHT: 117 LBS

## 2018-12-17 DIAGNOSIS — M32.14 SYSTEMIC LUPUS ERYTHEMATOSUS WITH GLOMERULAR DISEASE, UNSPECIFIED SLE TYPE (HCC): Primary | ICD-10-CM

## 2018-12-17 DIAGNOSIS — R00.2 PALPITATIONS: ICD-10-CM

## 2018-12-17 DIAGNOSIS — Z51.81 THERAPEUTIC DRUG MONITORING: ICD-10-CM

## 2018-12-17 PROCEDURE — 99212 OFFICE O/P EST SF 10 MIN: CPT | Performed by: INTERNAL MEDICINE

## 2018-12-17 PROCEDURE — 99214 OFFICE O/P EST MOD 30 MIN: CPT | Performed by: INTERNAL MEDICINE

## 2018-12-17 NOTE — PATIENT INSTRUCTIONS
1. hydroyxchlrouqine 200mg twice a day - taper to oncea d ay   2. Cont. Prednisone 20mg twice a day - will d/w dr. Bijal Carrera about dropping the prednisone   3. Cont. Cytoxan treatments   4. Return to clinic in 1-2 months.    5. Schedule 2 echo -

## 2018-12-17 NOTE — TELEPHONE ENCOUNTER
Called [facility] [phone] and spoke with Tyler Mcneil, customer care rep. whom verified pt does not require PA for testing.

## 2018-12-18 NOTE — TELEPHONE ENCOUNTER
----- Message from Dacia Strickland MD sent at 12/17/2018 11:41 AM CST -----  For now would CPM.  Will reevaluate after I see her labs after her next Cytoxan infusion which is scheduled for 12/26/18.   ----- Message -----  From: MD Estefanía Burton

## 2018-12-18 NOTE — TELEPHONE ENCOUNTER
Can you let pt. Know that dr. Moises Maharaj wants to wait to see how she does after her next infusion.  She should stay on prednisone 40mg a day for now

## 2018-12-18 NOTE — TELEPHONE ENCOUNTER
Called and pt unavailable, spoke with mother (release of information on file) and informed her of MD message below. Mother verbalized understanding and repeated back pt instructions.

## 2018-12-26 ENCOUNTER — LAB ENCOUNTER (OUTPATIENT)
Dept: LAB | Facility: HOSPITAL | Age: 23
End: 2018-12-26
Attending: INTERNAL MEDICINE
Payer: COMMERCIAL

## 2018-12-26 ENCOUNTER — OFFICE VISIT (OUTPATIENT)
Dept: HEMATOLOGY/ONCOLOGY | Facility: HOSPITAL | Age: 23
End: 2018-12-26
Attending: INTERNAL MEDICINE
Payer: COMMERCIAL

## 2018-12-26 ENCOUNTER — TELEPHONE (OUTPATIENT)
Dept: NEPHROLOGY | Facility: CLINIC | Age: 23
End: 2018-12-26

## 2018-12-26 VITALS
RESPIRATION RATE: 16 BRPM | BODY MASS INDEX: 20 KG/M2 | DIASTOLIC BLOOD PRESSURE: 88 MMHG | WEIGHT: 108.63 LBS | SYSTOLIC BLOOD PRESSURE: 141 MMHG | HEART RATE: 105 BPM | TEMPERATURE: 98 F

## 2018-12-26 DIAGNOSIS — M32.14 LUPUS NEPHRITIS (HCC): Primary | ICD-10-CM

## 2018-12-26 DIAGNOSIS — M32.14 LUPUS NEPHRITIS (HCC): ICD-10-CM

## 2018-12-26 DIAGNOSIS — N17.9 ACUTE RENAL FAILURE SUPERIMPOSED ON CHRONIC KIDNEY DISEASE, UNSPECIFIED CKD STAGE, UNSPECIFIED ACUTE RENAL FAILURE TYPE (HCC): ICD-10-CM

## 2018-12-26 DIAGNOSIS — N18.9 ACUTE RENAL FAILURE SUPERIMPOSED ON CHRONIC KIDNEY DISEASE, UNSPECIFIED CKD STAGE, UNSPECIFIED ACUTE RENAL FAILURE TYPE (HCC): ICD-10-CM

## 2018-12-26 PROCEDURE — 85027 COMPLETE CBC AUTOMATED: CPT

## 2018-12-26 PROCEDURE — 96375 TX/PRO/DX INJ NEW DRUG ADDON: CPT

## 2018-12-26 PROCEDURE — 96413 CHEMO IV INFUSION 1 HR: CPT

## 2018-12-26 PROCEDURE — 86140 C-REACTIVE PROTEIN: CPT

## 2018-12-26 PROCEDURE — A4216 STERILE WATER/SALINE, 10 ML: HCPCS

## 2018-12-26 PROCEDURE — 80069 RENAL FUNCTION PANEL: CPT

## 2018-12-26 PROCEDURE — 85007 BL SMEAR W/DIFF WBC COUNT: CPT

## 2018-12-26 PROCEDURE — 36415 COLL VENOUS BLD VENIPUNCTURE: CPT

## 2018-12-26 PROCEDURE — 85652 RBC SED RATE AUTOMATED: CPT

## 2018-12-26 PROCEDURE — 82570 ASSAY OF URINE CREATININE: CPT

## 2018-12-26 PROCEDURE — 85025 COMPLETE CBC W/AUTO DIFF WBC: CPT

## 2018-12-26 PROCEDURE — 82043 UR ALBUMIN QUANTITATIVE: CPT

## 2018-12-26 PROCEDURE — 81001 URINALYSIS AUTO W/SCOPE: CPT

## 2018-12-26 RX ORDER — DIPHENHYDRAMINE HYDROCHLORIDE 50 MG/ML
50 INJECTION INTRAMUSCULAR; INTRAVENOUS ONCE
Status: DISCONTINUED | OUTPATIENT
Start: 2018-12-26 | End: 2018-12-26

## 2018-12-26 RX ORDER — 0.9 % SODIUM CHLORIDE 0.9 %
VIAL (ML) INJECTION
Status: DISCONTINUED
Start: 2018-12-26 | End: 2018-12-26

## 2018-12-26 RX ORDER — ONDANSETRON 2 MG/ML
8 INJECTION INTRAMUSCULAR; INTRAVENOUS ONCE
Status: COMPLETED | OUTPATIENT
Start: 2018-12-26 | End: 2018-12-26

## 2018-12-26 RX ORDER — ONDANSETRON 2 MG/ML
INJECTION INTRAMUSCULAR; INTRAVENOUS
Status: COMPLETED
Start: 2018-12-26 | End: 2018-12-26

## 2018-12-26 RX ORDER — SODIUM CHLORIDE 9 MG/ML
INJECTION, SOLUTION INTRAVENOUS
Status: DISCONTINUED
Start: 2018-12-26 | End: 2018-12-26

## 2018-12-26 RX ORDER — DIPHENHYDRAMINE HCL 50 MG
50 CAPSULE ORAL ONCE
Status: DISCONTINUED | OUTPATIENT
Start: 2018-12-26 | End: 2018-12-26

## 2018-12-26 RX ORDER — DIPHENHYDRAMINE HCL 50 MG
50 CAPSULE ORAL ONCE
Status: CANCELLED
Start: 2018-12-26 | End: 2018-12-26

## 2018-12-26 RX ORDER — ONDANSETRON 2 MG/ML
8 INJECTION INTRAMUSCULAR; INTRAVENOUS ONCE
Status: CANCELLED
Start: 2018-12-26 | End: 2018-12-26

## 2018-12-26 RX ORDER — DIPHENHYDRAMINE HYDROCHLORIDE 50 MG/ML
50 INJECTION INTRAMUSCULAR; INTRAVENOUS ONCE
Status: CANCELLED
Start: 2018-12-26 | End: 2018-12-26

## 2018-12-26 RX ADMIN — ONDANSETRON 8 MG: 2 INJECTION INTRAMUSCULAR; INTRAVENOUS at 09:15:00

## 2018-12-26 NOTE — PROGRESS NOTES
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 Pt states she is feeling well overall today - just now at 0730, she went to the [facility] lab to have labs drawn, await CBC results. Pt reports less fatigue and less pain in her joints.    P

## 2018-12-27 ENCOUNTER — TELEPHONE (OUTPATIENT)
Dept: NEPHROLOGY | Facility: CLINIC | Age: 23
End: 2018-12-27

## 2018-12-27 NOTE — TELEPHONE ENCOUNTER
Wear support hose. For now continue Bumex every other day. See me for follow-up after next blood draw.

## 2018-12-27 NOTE — TELEPHONE ENCOUNTER
Contacted pt's mother, May Fearing (consent on file). Notified her of MKK's message below. She states pt does seem to retain fluid in just her feet.  Her feet are fine when she gets up or when she's laying down, but when she's up on her feet she does have quit

## 2018-12-27 NOTE — TELEPHONE ENCOUNTER
Contacted pt's mother, Martita Perkins (consent on file). Return to work date is 1/10/19. Letter needs to be faxed to Rafa Goldman at [facility]. At [phone]. Letter generated and faxed.

## 2018-12-27 NOTE — TELEPHONE ENCOUNTER
Labs showed that the amount of protein in the urine is declining. Kidney function mildly abnormal.  Try decreasing Bumex to 0.5 mg every third day. Let me know if she develops any significant edema. Make sure she is drinking enough fluids.   Avoid nonste

## 2018-12-31 ENCOUNTER — TELEPHONE (OUTPATIENT)
Dept: NEPHROLOGY | Facility: CLINIC | Age: 23
End: 2018-12-31

## 2018-12-31 PROBLEM — E83.39 HYPERPHOSPHATEMIA: Status: ACTIVE | Noted: 2018-12-31

## 2018-12-31 NOTE — TELEPHONE ENCOUNTER
Medication information sent to plan. This is a recently new kidney patient who was being previously managed at [facility]. There are no notes to indicate how long patient has been taking Sevelamer but she did have elevated Phosphorus levels.  We do no

## 2019-01-02 NOTE — TELEPHONE ENCOUNTER
Received email from [address] [place] [address] [employer] that PA was cancelled. It was not stated if medication has been covered or [employer] cancelled the PA request. I did send all the office notes and lab results to the insurance in case this was cancelled in error.

## 2019-01-08 ENCOUNTER — LAB ENCOUNTER (OUTPATIENT)
Dept: LAB | Facility: HOSPITAL | Age: 24
End: 2019-01-08
Attending: INTERNAL MEDICINE
Payer: COMMERCIAL

## 2019-01-08 DIAGNOSIS — M32.14 LUPUS NEPHRITIS (HCC): ICD-10-CM

## 2019-01-08 DIAGNOSIS — N17.9 ACUTE RENAL FAILURE SUPERIMPOSED ON CHRONIC KIDNEY DISEASE, UNSPECIFIED CKD STAGE, UNSPECIFIED ACUTE RENAL FAILURE TYPE (HCC): ICD-10-CM

## 2019-01-08 DIAGNOSIS — N18.9 ACUTE RENAL FAILURE SUPERIMPOSED ON CHRONIC KIDNEY DISEASE, UNSPECIFIED CKD STAGE, UNSPECIFIED ACUTE RENAL FAILURE TYPE (HCC): ICD-10-CM

## 2019-01-08 LAB
ALBUMIN SERPL BCP-MCNC: 1.6 G/DL (ref 3.5–4.8)
ANION GAP SERPL CALC-SCNC: 8 MMOL/L (ref 0–18)
BASOPHILS # BLD: 0 K/UL (ref 0–0.2)
BASOPHILS NFR BLD: 0 %
BILIRUB UR QL: NEGATIVE
BUN SERPL-MCNC: 20 MG/DL (ref 8–20)
BUN/CREAT SERPL: 23.8 (ref 10–20)
CALCIUM SERPL-MCNC: 7.8 MG/DL (ref 8.5–10.5)
CHLORIDE SERPL-SCNC: 109 MMOL/L (ref 95–110)
CO2 SERPL-SCNC: 22 MMOL/L (ref 22–32)
COLOR UR: YELLOW
CREAT SERPL-MCNC: 0.84 MG/DL (ref 0.5–1.5)
CREAT UR-MCNC: 78.5 MG/DL
CRP SERPL-MCNC: <0.5 MG/DL (ref 0–0.9)
EOSINOPHIL # BLD: 0 K/UL (ref 0–0.7)
EOSINOPHIL NFR BLD: 0 %
ERYTHROCYTE [DISTWIDTH] IN BLOOD BY AUTOMATED COUNT: 15.1 % (ref 11–15)
ERYTHROCYTE [SEDIMENTATION RATE] IN BLOOD: 91 MM/HR (ref 0–20)
GLUCOSE SERPL-MCNC: 90 MG/DL (ref 70–99)
GLUCOSE UR-MCNC: NEGATIVE MG/DL
HCT VFR BLD AUTO: 23.8 % (ref 35–48)
HGB BLD-MCNC: 8 G/DL (ref 12–16)
HYALINE CASTS #/AREA URNS AUTO: 5 /LPF
KETONES UR-MCNC: NEGATIVE MG/DL
LYMPHOCYTES # BLD: 0.7 K/UL (ref 1–4)
LYMPHOCYTES NFR BLD: 8 %
MCH RBC QN AUTO: 32.2 PG (ref 27–32)
MCHC RBC AUTO-ENTMCNC: 33.4 G/DL (ref 32–37)
MCV RBC AUTO: 96.4 FL (ref 80–100)
MICROALBUMIN UR-MCNC: 301.2 MG/DL (ref 0–1.8)
MICROALBUMIN/CREAT UR: 3836.9 MG/G{CREAT} (ref 0–20)
MONOCYTES # BLD: 0.6 K/UL (ref 0–1)
MONOCYTES NFR BLD: 7 %
NEUTROPHILS # BLD AUTO: 7.6 K/UL (ref 1.8–7.7)
NEUTROPHILS NFR BLD: 84 %
NEUTS BAND NFR BLD: 1 %
NITRITE UR QL STRIP.AUTO: NEGATIVE
OSMOLALITY UR CALC.SUM OF ELEC: 290 MOSM/KG (ref 275–295)
PH UR: 6 [PH] (ref 5–8)
PHOSPHATE SERPL-MCNC: 3.6 MG/DL (ref 2.4–4.7)
PLATELET # BLD AUTO: 188 K/UL (ref 140–400)
PMV BLD AUTO: 9.2 FL (ref 7.4–10.3)
POTASSIUM SERPL-SCNC: 4.2 MMOL/L (ref 3.3–5.1)
PROT UR-MCNC: >=500 MG/DL
RBC # BLD AUTO: 2.47 M/UL (ref 3.7–5.4)
RBC #/AREA URNS AUTO: 5 /HPF
SODIUM SERPL-SCNC: 139 MMOL/L (ref 136–144)
SP GR UR STRIP: 1.01 (ref 1–1.03)
UROBILINOGEN UR STRIP-ACNC: <2
VIT C UR-MCNC: NEGATIVE MG/DL
WBC # BLD AUTO: 8.9 K/UL (ref 4–11)
WBC #/AREA URNS AUTO: 22 /HPF

## 2019-01-08 PROCEDURE — 81001 URINALYSIS AUTO W/SCOPE: CPT

## 2019-01-08 PROCEDURE — 80069 RENAL FUNCTION PANEL: CPT

## 2019-01-08 PROCEDURE — 82570 ASSAY OF URINE CREATININE: CPT

## 2019-01-08 PROCEDURE — 85027 COMPLETE CBC AUTOMATED: CPT

## 2019-01-08 PROCEDURE — 85025 COMPLETE CBC W/AUTO DIFF WBC: CPT

## 2019-01-08 PROCEDURE — 36415 COLL VENOUS BLD VENIPUNCTURE: CPT

## 2019-01-08 PROCEDURE — 85007 BL SMEAR W/DIFF WBC COUNT: CPT

## 2019-01-08 PROCEDURE — 86140 C-REACTIVE PROTEIN: CPT

## 2019-01-08 PROCEDURE — 82043 UR ALBUMIN QUANTITATIVE: CPT

## 2019-01-08 PROCEDURE — 85652 RBC SED RATE AUTOMATED: CPT

## 2019-01-09 ENCOUNTER — OFFICE VISIT (OUTPATIENT)
Dept: NEPHROLOGY | Facility: CLINIC | Age: 24
End: 2019-01-09
Payer: COMMERCIAL

## 2019-01-09 VITALS
SYSTOLIC BLOOD PRESSURE: 141 MMHG | HEART RATE: 88 BPM | WEIGHT: 112.19 LBS | BODY MASS INDEX: 20.65 KG/M2 | HEIGHT: 62 IN | DIASTOLIC BLOOD PRESSURE: 92 MMHG

## 2019-01-09 DIAGNOSIS — M32.14 LUPUS NEPHRITIS (HCC): Primary | ICD-10-CM

## 2019-01-09 PROCEDURE — 99212 OFFICE O/P EST SF 10 MIN: CPT | Performed by: INTERNAL MEDICINE

## 2019-01-09 PROCEDURE — 99214 OFFICE O/P EST MOD 30 MIN: CPT | Performed by: INTERNAL MEDICINE

## 2019-01-09 RX ORDER — PREDNISONE 20 MG/1
30 TABLET ORAL DAILY
Qty: 60 TABLET | Refills: 2 | COMMUNITY
Start: 2019-01-09

## 2019-01-09 RX ORDER — AZATHIOPRINE 50 MG/1
50 TABLET ORAL DAILY
Qty: 30 TABLET | Refills: 5 | Status: SHIPPED | OUTPATIENT
Start: 2019-01-09

## 2019-01-09 RX ORDER — AMLODIPINE BESYLATE 5 MG/1
5 TABLET ORAL DAILY
Qty: 30 TABLET | Refills: 5 | Status: SHIPPED | OUTPATIENT
Start: 2019-01-09

## 2019-01-09 NOTE — PROGRESS NOTES
[facility], [facility]  Nephrology Daily Progress Note    Adele Abbasi  [id number]  24year old  Patient presents with:  Test Results      HPI:   Adele Abbasi is a 24year old female.   26-year-old -American female with a history of systemic lupus and is mildly cushingoid.   Neck/Thyroid: neck is supple without adenopathy  Lymphatic: no abnormal cervical, supraclavicular or axillary adenopathy is noted  Respiratory: normal to inspection lungs are clear to auscultation bilaterally normal respiratory e nightly., Disp: 30 tablet, Rfl: 2  •  bumetanide 1 MG Oral Tab, Take 0.5 mg by mouth every other day.  , Disp: 15 tablet, Rfl: 2  •  Metoprolol Tartrate 50 MG Oral Tab, Take 1 tablet (50 mg total) by mouth 2x Daily(Beta Blocker). , Disp: 60 tablet, Rfl: 1

## 2019-01-10 NOTE — PATIENT INSTRUCTIONS
You can stop the Bactrim and sevelamer. Decrease prednisone to 30 mg every morning. Start azathioprine 50 mg daily. Resume amlodipine 5 mg daily. Repeat your labs in 4 weeks.

## 2019-01-29 ENCOUNTER — HOSPITAL ENCOUNTER (EMERGENCY)
Facility: HOSPITAL | Age: 24
Discharge: HOME | End: 2019-01-29
Attending: EMERGENCY MEDICINE

## 2019-01-29 VITALS
DIASTOLIC BLOOD PRESSURE: 96 MMHG | HEART RATE: 103 BPM | OXYGEN SATURATION: 100 % | TEMPERATURE: 99 F | SYSTOLIC BLOOD PRESSURE: 139 MMHG

## 2019-01-29 DIAGNOSIS — M25.561 ARTHRALGIA OF BOTH KNEES: ICD-10-CM

## 2019-01-29 DIAGNOSIS — M32.14 SYSTEMIC LUPUS ERYTHEMATOSUS WITH GLOMERULAR DISEASE, UNSPECIFIED SLE TYPE (HCC): Primary | ICD-10-CM

## 2019-01-29 DIAGNOSIS — M25.562 ARTHRALGIA OF BOTH KNEES: ICD-10-CM

## 2019-01-29 LAB
ANION GAP SERPL CALC-SCNC: 10 MMOL/L (ref 0–18)
B-HCG UR QL: NEGATIVE
BACTERIA UR QL AUTO: NEGATIVE /HPF
BASOPHILS # BLD AUTO: 0.02 X10(3) UL (ref 0–0.2)
BASOPHILS NFR BLD AUTO: 0.2 %
BILIRUB UR QL: NEGATIVE
BUN SERPL-MCNC: 27 MG/DL (ref 8–20)
BUN/CREAT SERPL: 24.3 (ref 10–20)
CALCIUM SERPL-MCNC: 7.8 MG/DL (ref 8.5–10.5)
CHLORIDE SERPL-SCNC: 105 MMOL/L (ref 95–110)
CLARITY UR: CLEAR
CO2 SERPL-SCNC: 23 MMOL/L (ref 22–32)
COLOR UR: YELLOW
CREAT SERPL-MCNC: 1.11 MG/DL (ref 0.5–1.5)
DEPRECATED RDW RBC AUTO: 48.4 FL (ref 35.1–46.3)
EOSINOPHIL # BLD AUTO: 0.03 X10(3) UL (ref 0–0.7)
EOSINOPHIL NFR BLD AUTO: 0.3 %
ERYTHROCYTE [DISTWIDTH] IN BLOOD BY AUTOMATED COUNT: 13.4 % (ref 11–15)
ERYTHROCYTE [SEDIMENTATION RATE] IN BLOOD: 15 MM/HR (ref 0–20)
GLUCOSE SERPL-MCNC: 93 MG/DL (ref 70–99)
GLUCOSE UR-MCNC: NEGATIVE MG/DL
HCT VFR BLD AUTO: 26.5 % (ref 35–48)
HGB BLD-MCNC: 8.3 G/DL (ref 12–16)
IMM GRANULOCYTES # BLD AUTO: 0.2 X10(3) UL (ref 0–1)
IMM GRANULOCYTES NFR BLD: 2.2 %
KETONES UR-MCNC: NEGATIVE MG/DL
LYMPHOCYTES # BLD AUTO: 1.1 X10(3) UL (ref 1–4)
LYMPHOCYTES NFR BLD AUTO: 12.4 %
MCH RBC QN AUTO: 31.1 PG (ref 26–34)
MCHC RBC AUTO-ENTMCNC: 31.3 G/DL (ref 31–37)
MCV RBC AUTO: 99.3 FL (ref 80–100)
MONOCYTES # BLD AUTO: 0.93 X10(3) UL (ref 0.1–1)
MONOCYTES NFR BLD AUTO: 10.4 %
NEUTROPHILS # BLD AUTO: 6.62 X10 (3) UL (ref 1.5–7.7)
NEUTROPHILS # BLD AUTO: 6.62 X10(3) UL (ref 1.5–7.7)
NEUTROPHILS NFR BLD AUTO: 74.5 %
NITRITE UR QL STRIP.AUTO: NEGATIVE
OSMOLALITY UR CALC.SUM OF ELEC: 291 MOSM/KG (ref 275–295)
PH UR: 6 [PH] (ref 5–8)
PLATELET # BLD AUTO: 150 10(3)UL (ref 150–450)
POTASSIUM SERPL-SCNC: 3.4 MMOL/L (ref 3.3–5.1)
PROT UR-MCNC: 100 MG/DL
RBC # BLD AUTO: 2.67 X10(6)UL (ref 3.8–5.3)
RBC #/AREA URNS AUTO: 7 /HPF
SODIUM SERPL-SCNC: 138 MMOL/L (ref 136–144)
SP GR UR STRIP: 1.01 (ref 1–1.03)
UROBILINOGEN UR STRIP-ACNC: <2
VIT C UR-MCNC: NEGATIVE MG/DL
WBC # BLD AUTO: 8.9 X10(3) UL (ref 4–11)
WBC #/AREA URNS AUTO: 3 /HPF

## 2019-01-29 PROCEDURE — 85025 COMPLETE CBC W/AUTO DIFF WBC: CPT | Performed by: EMERGENCY MEDICINE

## 2019-01-29 PROCEDURE — 81025 URINE PREGNANCY TEST: CPT

## 2019-01-29 PROCEDURE — 85652 RBC SED RATE AUTOMATED: CPT | Performed by: EMERGENCY MEDICINE

## 2019-01-29 PROCEDURE — 81001 URINALYSIS AUTO W/SCOPE: CPT | Performed by: EMERGENCY MEDICINE

## 2019-01-29 PROCEDURE — 36415 COLL VENOUS BLD VENIPUNCTURE: CPT

## 2019-01-29 PROCEDURE — 99283 EMERGENCY DEPT VISIT LOW MDM: CPT

## 2019-01-29 PROCEDURE — 80048 BASIC METABOLIC PNL TOTAL CA: CPT | Performed by: EMERGENCY MEDICINE

## 2019-01-29 RX ORDER — HYDROCODONE BITARTRATE AND ACETAMINOPHEN 5; 325 MG/1; MG/1
1-2 TABLET ORAL EVERY 4 HOURS PRN
Qty: 10 TABLET | Refills: 0 | Status: SHIPPED | OUTPATIENT
Start: 2019-01-29 | End: 2019-02-05

## 2019-01-29 NOTE — ED PROVIDER NOTES
Patient Seen in: [facility] AND [facility] Emergency Department    History   Patient presents with:  Pain (neurologic)    Stated Complaint:     HPI    Patient is a 70-year-old female who presents to the emergency department with a chief complaint of knee pain. Musculoskeletal: Normal range of motion. Legs:  Neurological: She is alert and oriented to person, place, and time. Skin: Skin is warm and dry. No rash noted. Nursing note and vitals reviewed.             ED Course     Labs Reviewed   URINALYSIS Systemic lupus erythematosus with glomerular disease, unspecified SLE type ([facility] [address]  (primary encounter diagnosis)  Arthralgia of both knees    Disposition:  Discharge  1/29/2019  9:29 am    Follow-up:  Dania Ibarra MD  [address] [id number]

## 2019-04-07 ENCOUNTER — HOSPITAL ENCOUNTER (EMERGENCY)
Facility: HOSPITAL | Age: 24
Discharge: HOME | End: 2019-04-07
Attending: EMERGENCY MEDICINE

## 2019-04-07 VITALS
TEMPERATURE: 99 F | RESPIRATION RATE: 18 BRPM | BODY MASS INDEX: 22.97 KG/M2 | DIASTOLIC BLOOD PRESSURE: 76 MMHG | WEIGHT: 117 LBS | HEART RATE: 112 BPM | SYSTOLIC BLOOD PRESSURE: 120 MMHG | HEIGHT: 60 IN | OXYGEN SATURATION: 100 %

## 2019-04-07 DIAGNOSIS — L03.039 CELLULITIS OF TOE, UNSPECIFIED LATERALITY: Primary | ICD-10-CM

## 2019-04-07 DIAGNOSIS — H10.9 CONJUNCTIVITIS OF BOTH EYES, UNSPECIFIED CONJUNCTIVITIS TYPE: ICD-10-CM

## 2019-04-07 PROCEDURE — 87430 STREP A AG IA: CPT

## 2019-04-07 PROCEDURE — 99283 EMERGENCY DEPT VISIT LOW MDM: CPT

## 2019-04-07 RX ORDER — CEPHALEXIN 500 MG/1
500 CAPSULE ORAL 4 TIMES DAILY
Qty: 40 CAPSULE | Refills: 0 | Status: SHIPPED | OUTPATIENT
Start: 2019-04-07 | End: 2019-04-17

## 2019-04-07 RX ORDER — GENTAMICIN SULFATE 3 MG/ML
2 SOLUTION/ DROPS OPHTHALMIC
Qty: 1 BOTTLE | Refills: 0 | Status: SHIPPED | OUTPATIENT
Start: 2019-04-07 | End: 2019-04-12

## 2019-04-07 NOTE — ED PROVIDER NOTES
Patient Seen in: [facility] AND [facility] Emergency Department    History   Patient presents with:  Sore Throat    Stated Complaint: Sore throat    HPI    29-year-old female with history of lupus presents with complaints of bilateral eye redness, sore throat, b Oropharynx normal-appearing.   Respiratory: there are no retractions, lungs are clear to auscultation  Cardiovascular: regular rate and rhythm  Gastrointestinal:  abdomen is soft and non tender, no masses, bowel sounds normal  Neurological: Speech normal.

## 2019-04-07 NOTE — ED INITIAL ASSESSMENT (HPI)
Pt c/o sore throat and hoarse voice. Also c/o sore areas on both great toes after a pedicure a few days ago.

## 2019-08-14 ENCOUNTER — HOSPITAL ENCOUNTER (EMERGENCY)
Facility: HOSPITAL | Age: 24
Discharge: HOME | End: 2019-08-14

## 2019-08-14 VITALS
BODY MASS INDEX: 21.79 KG/M2 | HEART RATE: 101 BPM | OXYGEN SATURATION: 100 % | TEMPERATURE: 99 F | RESPIRATION RATE: 20 BRPM | DIASTOLIC BLOOD PRESSURE: 90 MMHG | WEIGHT: 111 LBS | SYSTOLIC BLOOD PRESSURE: 132 MMHG | HEIGHT: 60 IN

## 2019-08-14 DIAGNOSIS — N75.1 BARTHOLIN'S GLAND ABSCESS: Primary | ICD-10-CM

## 2019-08-14 PROCEDURE — 99284 EMERGENCY DEPT VISIT MOD MDM: CPT

## 2019-08-14 PROCEDURE — 87205 SMEAR GRAM STAIN: CPT | Performed by: NURSE PRACTITIONER

## 2019-08-14 PROCEDURE — 87070 CULTURE OTHR SPECIMN AEROBIC: CPT | Performed by: NURSE PRACTITIONER

## 2019-08-14 RX ORDER — SULFAMETHOXAZOLE AND TRIMETHOPRIM 800; 160 MG/1; MG/1
1 TABLET ORAL 2 TIMES DAILY
Qty: 20 TABLET | Refills: 0 | Status: SHIPPED | OUTPATIENT
Start: 2019-08-14 | End: 2019-08-24

## 2019-08-14 RX ORDER — HYDROCODONE BITARTRATE AND ACETAMINOPHEN 5; 325 MG/1; MG/1
1-2 TABLET ORAL EVERY 6 HOURS PRN
Qty: 10 TABLET | Refills: 0 | Status: SHIPPED | OUTPATIENT
Start: 2019-08-14 | End: 2019-08-21

## 2019-08-14 RX ORDER — HYDROCODONE BITARTRATE AND ACETAMINOPHEN 5; 325 MG/1; MG/1
1 TABLET ORAL ONCE
Status: COMPLETED | OUTPATIENT
Start: 2019-08-14 | End: 2019-08-14

## 2019-08-14 NOTE — ED PROVIDER NOTES
Patient Seen in: [facility] AND [facility] Emergency Department    History   Patient presents with:  Abscess (integumentary)    Stated Complaint:     HPI    23yo/f with hx of anemia, SLE, HL, (follows with Rheum at [facility]) reports to the ED with vega Mcdonough discharge found. Genitourinary Comments: Left sided internal barholins abscess, ttp   Musculoskeletal: Normal range of motion. She exhibits no tenderness or deformity. Neurological: She is alert and oriented to person, place, and time.  She has normal s Oral Tab  Take 1-2 tablets by mouth every 6 (six) hours as needed for Pain.   Qty: 10 tablet Refills: 0

## 2020-04-20 ENCOUNTER — HOSPITAL ENCOUNTER (OUTPATIENT)
Age: 25
Discharge: STILL ADMITTED OR ADMINISTRATIVE | End: 2020-04-20

## 2020-08-18 ENCOUNTER — HOSPITAL ENCOUNTER (EMERGENCY)
Facility: HOSPITAL | Age: 25
Discharge: HOME | End: 2020-08-18
Attending: EMERGENCY MEDICINE
Payer: MEDICAID

## 2020-08-18 VITALS
WEIGHT: 116 LBS | BODY MASS INDEX: 22.78 KG/M2 | HEART RATE: 60 BPM | OXYGEN SATURATION: 98 % | RESPIRATION RATE: 20 BRPM | HEIGHT: 60 IN | DIASTOLIC BLOOD PRESSURE: 70 MMHG | TEMPERATURE: 98 F | SYSTOLIC BLOOD PRESSURE: 121 MMHG

## 2020-08-18 DIAGNOSIS — N75.1 BARTHOLIN'S GLAND ABSCESS: Primary | ICD-10-CM

## 2020-08-18 PROCEDURE — 99283 EMERGENCY DEPT VISIT LOW MDM: CPT

## 2020-08-18 PROCEDURE — 56420 I&D BARTHOLINS GLAND ABSCESS: CPT

## 2020-08-18 RX ORDER — LIDOCAINE AND PRILOCAINE 25; 25 MG/G; MG/G
CREAM TOPICAL ONCE
Status: COMPLETED | OUTPATIENT
Start: 2020-08-18 | End: 2020-08-18

## 2020-08-18 RX ORDER — HYDROCODONE BITARTRATE AND ACETAMINOPHEN 5; 325 MG/1; MG/1
1 TABLET ORAL ONCE
Status: COMPLETED | OUTPATIENT
Start: 2020-08-18 | End: 2020-08-18

## 2020-08-18 RX ORDER — SULFAMETHOXAZOLE AND TRIMETHOPRIM 800; 160 MG/1; MG/1
1 TABLET ORAL 2 TIMES DAILY
Qty: 20 TABLET | Refills: 0 | Status: SHIPPED | OUTPATIENT
Start: 2020-08-18 | End: 2020-08-28

## 2020-08-18 RX ORDER — HYDROCODONE BITARTRATE AND ACETAMINOPHEN 5; 325 MG/1; MG/1
1-2 TABLET ORAL EVERY 6 HOURS PRN
Qty: 10 TABLET | Refills: 0 | Status: SHIPPED | OUTPATIENT
Start: 2020-08-18

## 2020-08-19 NOTE — ED PROVIDER NOTES
Patient Seen in: [facility] AND [facility] Emergency Department      History   Patient presents with:  Abscess    Stated Complaint: labial abscess    HPI    31year old female with left labial abscess starting yesterday.  She has had one in the past but states th Effort: Pulmonary effort is normal. No respiratory distress. Genitourinary:     Labia:         Left: Tenderness present. Musculoskeletal: Normal range of motion. General: No deformity. Skin:     General: Skin is warm and dry.       Fin Take 1-2 tablets by mouth every 6 (six) hours as needed for Pain (For severe pain. Do not take while driving or operating heavy machinery. )., Normal, Disp-10 tablet, R-0

## 2020-10-11 ENCOUNTER — HOSPITAL ENCOUNTER (EMERGENCY)
Facility: HOSPITAL | Age: 25
Discharge: HOME | End: 2020-10-11
Attending: EMERGENCY MEDICINE
Payer: MEDICAID

## 2020-10-11 VITALS
WEIGHT: 130 LBS | DIASTOLIC BLOOD PRESSURE: 101 MMHG | TEMPERATURE: 98 F | HEART RATE: 78 BPM | OXYGEN SATURATION: 99 % | HEIGHT: 60 IN | SYSTOLIC BLOOD PRESSURE: 162 MMHG | BODY MASS INDEX: 25.52 KG/M2 | RESPIRATION RATE: 18 BRPM

## 2020-10-11 DIAGNOSIS — N75.1 BARTHOLIN'S GLAND ABSCESS: Primary | ICD-10-CM

## 2020-10-11 PROCEDURE — 56420 I&D BARTHOLINS GLAND ABSCESS: CPT

## 2020-10-11 PROCEDURE — 99283 EMERGENCY DEPT VISIT LOW MDM: CPT

## 2020-10-11 RX ORDER — SULFAMETHOXAZOLE AND TRIMETHOPRIM 800; 160 MG/1; MG/1
1 TABLET ORAL 2 TIMES DAILY
Qty: 14 TABLET | Refills: 0 | Status: SHIPPED | OUTPATIENT
Start: 2020-10-11 | End: 2020-10-18

## 2020-10-11 RX ORDER — HYDROCODONE BITARTRATE AND ACETAMINOPHEN 5; 325 MG/1; MG/1
1-2 TABLET ORAL EVERY 6 HOURS PRN
Qty: 10 TABLET | Refills: 0 | Status: SHIPPED | OUTPATIENT
Start: 2020-10-11 | End: 2020-10-18

## 2020-10-11 NOTE — ED NOTES
Patient cleared for discharge by APRN. Patient verbalizes understanding of discharge instructions and prescriptions. Patient ambulatory with a steady gait upon discharge.

## 2020-10-11 NOTE — ED PROVIDER NOTES
Patient Seen in: [facility] AND [facility] Emergency Department      History   No chief complaint on file. Stated Complaint: Abscess    HPI    29-year-old female presents to the emergency department with complaints of an abscess to her labia.   She states she abscess was located left bartholins. I obtained verbal consent from the patient to drain the abscess who was informed about the possibility of bleeding, pain and worsening of the condition.   The abscess was incised with a scalpel and a large amount of pu

## 2020-10-12 ENCOUNTER — HOSPITAL ENCOUNTER (EMERGENCY)
Facility: HOSPITAL | Age: 25
Discharge: HOME | End: 2020-10-12
Attending: EMERGENCY MEDICINE
Payer: MEDICAID

## 2020-10-12 VITALS
BODY MASS INDEX: 25.52 KG/M2 | HEIGHT: 60 IN | DIASTOLIC BLOOD PRESSURE: 110 MMHG | RESPIRATION RATE: 18 BRPM | HEART RATE: 81 BPM | TEMPERATURE: 97 F | WEIGHT: 130 LBS | SYSTOLIC BLOOD PRESSURE: 160 MMHG | OXYGEN SATURATION: 99 %

## 2020-10-12 DIAGNOSIS — N75.1 BARTHOLIN'S GLAND ABSCESS: Primary | ICD-10-CM

## 2020-10-12 PROCEDURE — 99283 EMERGENCY DEPT VISIT LOW MDM: CPT

## 2020-10-12 PROCEDURE — 56420 I&D BARTHOLINS GLAND ABSCESS: CPT

## 2020-10-12 RX ORDER — HYDROCODONE BITARTRATE AND ACETAMINOPHEN 5; 325 MG/1; MG/1
1 TABLET ORAL ONCE
Status: COMPLETED | OUTPATIENT
Start: 2020-10-12 | End: 2020-10-12

## 2020-10-12 RX ORDER — LIDOCAINE HYDROCHLORIDE 10 MG/ML
20 INJECTION, SOLUTION EPIDURAL; INFILTRATION; INTRACAUDAL; PERINEURAL ONCE
Status: COMPLETED | OUTPATIENT
Start: 2020-10-12 | End: 2020-10-12

## 2020-10-14 NOTE — ED PROVIDER NOTES
Patient Seen in: [facility] AND [facility] Emergency Department      History   Patient presents with:  Abscess    Stated Complaint: abscesss    HPI    13-year-old female presents for evaluation of genital pain.   Patient seen on 10/11/2020 for Bartholin cyst, had Palpations: Abdomen is soft. Tenderness: There is no abdominal tenderness. There is no guarding or rebound.    Genitourinary:     Comments: Left-sided Bartholin cyst abscess with a large amount of vulvar swelling  Musculoskeletal: Normal range of motio

## 2020-11-21 ENCOUNTER — HOSPITAL ENCOUNTER (EMERGENCY)
Facility: HOSPITAL | Age: 25
Discharge: HOME | End: 2020-11-21
Payer: MEDICAID

## 2020-11-21 VITALS
DIASTOLIC BLOOD PRESSURE: 94 MMHG | TEMPERATURE: 98 F | RESPIRATION RATE: 18 BRPM | OXYGEN SATURATION: 99 % | HEART RATE: 97 BPM | BODY MASS INDEX: 25.52 KG/M2 | SYSTOLIC BLOOD PRESSURE: 153 MMHG | WEIGHT: 130 LBS | HEIGHT: 60 IN

## 2020-11-21 DIAGNOSIS — N75.1 BARTHOLIN'S GLAND ABSCESS: Primary | ICD-10-CM

## 2020-11-21 PROCEDURE — 56420 I&D BARTHOLINS GLAND ABSCESS: CPT

## 2020-11-21 PROCEDURE — 99283 EMERGENCY DEPT VISIT LOW MDM: CPT

## 2020-11-21 RX ORDER — SULFAMETHOXAZOLE AND TRIMETHOPRIM 800; 160 MG/1; MG/1
1 TABLET ORAL 2 TIMES DAILY
Qty: 14 TABLET | Refills: 0 | Status: SHIPPED | OUTPATIENT
Start: 2020-11-21 | End: 2020-11-28

## 2020-11-21 RX ORDER — HYDROCODONE BITARTRATE AND ACETAMINOPHEN 5; 325 MG/1; MG/1
1 TABLET ORAL ONCE
Status: COMPLETED | OUTPATIENT
Start: 2020-11-21 | End: 2020-11-21

## 2020-11-21 NOTE — ED INITIAL ASSESSMENT (HPI)
Patient here with groin abscess on L side. Patient states it began growing about 2 days ago. Patient states this is the 4th time she has had one in that spot.

## 2020-11-21 NOTE — ED PROVIDER NOTES
Patient Seen in: [facility] AND [facility] Emergency Department      History   Patient presents with:  Abscess    Stated Complaint: abcess in groin    HPI    29-year-old female presents to the emergency department with complaints of an abscess to the left labia. tenderness and fluctuant to the left  Musculoskeletal:         General: No tenderness. Skin:     Findings: No rash. Neurological:      Mental Status: She is alert and oriented to person, place, and time.                ED Course   Labs Reviewed - No magda

## 2020-12-04 ENCOUNTER — HOSPITAL ENCOUNTER (EMERGENCY)
Facility: HOSPITAL | Age: 25
Discharge: HOME | End: 2020-12-04
Payer: MEDICAID

## 2020-12-04 VITALS
WEIGHT: 130 LBS | BODY MASS INDEX: 25.52 KG/M2 | SYSTOLIC BLOOD PRESSURE: 148 MMHG | HEIGHT: 60 IN | TEMPERATURE: 99 F | RESPIRATION RATE: 18 BRPM | OXYGEN SATURATION: 98 % | DIASTOLIC BLOOD PRESSURE: 88 MMHG | HEART RATE: 111 BPM

## 2020-12-04 DIAGNOSIS — N75.1 BARTHOLIN'S GLAND ABSCESS: Primary | ICD-10-CM

## 2020-12-04 PROCEDURE — 99283 EMERGENCY DEPT VISIT LOW MDM: CPT

## 2020-12-04 PROCEDURE — 81025 URINE PREGNANCY TEST: CPT

## 2020-12-04 PROCEDURE — 56420 I&D BARTHOLINS GLAND ABSCESS: CPT

## 2020-12-04 RX ORDER — HYDROCODONE BITARTRATE AND ACETAMINOPHEN 5; 325 MG/1; MG/1
1 TABLET ORAL ONCE
Status: COMPLETED | OUTPATIENT
Start: 2020-12-04 | End: 2020-12-04

## 2020-12-04 RX ORDER — SULFAMETHOXAZOLE AND TRIMETHOPRIM 800; 160 MG/1; MG/1
1 TABLET ORAL 2 TIMES DAILY
Qty: 14 TABLET | Refills: 0 | Status: SHIPPED | OUTPATIENT
Start: 2020-12-04 | End: 2020-12-11

## 2020-12-04 RX ORDER — HYDROCODONE BITARTRATE AND ACETAMINOPHEN 5; 325 MG/1; MG/1
1-2 TABLET ORAL EVERY 6 HOURS PRN
Qty: 10 TABLET | Refills: 0 | Status: SHIPPED | OUTPATIENT
Start: 2020-12-04 | End: 2020-12-11

## 2020-12-05 NOTE — ED PROVIDER NOTES
Patient Seen in: [facility] AND [facility] Emergency Department      History   Patient presents with:  Liset-MENDEZ    Stated Complaint: vaginal abscess    HPI    59-year-old female presents to the emergency department with an abscess to her left labia.   She states sh abdominal tenderness. Genitourinary:     Comments: Left labial abscess/ swelling and ttp  Musculoskeletal:         General: No tenderness. Skin:     Findings: No rash.    Neurological:      Mental Status: She is alert and oriented to person, place, and duplicate medications found. Please discuss with provider.

## 2021-04-24 ENCOUNTER — HOSPITAL ENCOUNTER (EMERGENCY)
Facility: HOSPITAL | Age: 26
Discharge: HOME | End: 2021-04-24
Attending: EMERGENCY MEDICINE
Payer: MEDICAID

## 2021-04-24 VITALS
DIASTOLIC BLOOD PRESSURE: 93 MMHG | SYSTOLIC BLOOD PRESSURE: 145 MMHG | TEMPERATURE: 98 F | HEART RATE: 102 BPM | WEIGHT: 120 LBS | RESPIRATION RATE: 16 BRPM | BODY MASS INDEX: 23.56 KG/M2 | HEIGHT: 60 IN | OXYGEN SATURATION: 96 %

## 2021-04-24 DIAGNOSIS — N76.4 LABIAL ABSCESS: Primary | ICD-10-CM

## 2021-04-24 PROCEDURE — 99283 EMERGENCY DEPT VISIT LOW MDM: CPT

## 2021-04-24 PROCEDURE — 81025 URINE PREGNANCY TEST: CPT

## 2021-04-24 PROCEDURE — 56405 I&D VULVA/PERINEAL ABSCESS: CPT

## 2021-04-24 PROCEDURE — 96372 THER/PROPH/DIAG INJ SC/IM: CPT

## 2021-04-24 RX ORDER — SULFAMETHOXAZOLE AND TRIMETHOPRIM 800; 160 MG/1; MG/1
1 TABLET ORAL 2 TIMES DAILY
Qty: 14 TABLET | Refills: 0 | Status: SHIPPED | OUTPATIENT
Start: 2021-04-24 | End: 2021-05-01

## 2021-04-24 NOTE — ED PROVIDER NOTES
Patient Seen in: [facility] AND [facility] Emergency Department      History   Patient presents with:  Eval-G    Stated Complaint: Cyst removal    HPI/Subjective:   HPI  Patient is a 21-year-old female history of lupus on hydroxychloroquine and steroids, anemia, equal, round, and reactive to light. Cardiovascular:      Rate and Rhythm: Regular rhythm. Tachycardia present. Pulmonary:      Effort: Pulmonary effort is normal. No respiratory distress. Breath sounds: Normal breath sounds.    Abdominal:      Gen possibility of bleeding, pain and worsening of the condition. The abscess was incised with an 11blade scalpel  and copious amount of purulent drainage was expressed. I irrigated and dressed the wound. The patient tolerated the procedure well.   The proce

## 2021-04-25 ENCOUNTER — HOSPITAL ENCOUNTER (EMERGENCY)
Facility: HOSPITAL | Age: 26
Discharge: HOME | End: 2021-04-25
Attending: EMERGENCY MEDICINE
Payer: MEDICAID

## 2021-04-25 VITALS
SYSTOLIC BLOOD PRESSURE: 126 MMHG | TEMPERATURE: 98 F | HEIGHT: 60 IN | DIASTOLIC BLOOD PRESSURE: 87 MMHG | RESPIRATION RATE: 18 BRPM | OXYGEN SATURATION: 98 % | WEIGHT: 120 LBS | BODY MASS INDEX: 23.56 KG/M2 | HEART RATE: 100 BPM

## 2021-04-25 DIAGNOSIS — N75.1 BARTHOLIN'S GLAND ABSCESS: Primary | ICD-10-CM

## 2021-04-25 PROCEDURE — 99281 EMR DPT VST MAYX REQ PHY/QHP: CPT

## 2021-04-25 PROCEDURE — 96372 THER/PROPH/DIAG INJ SC/IM: CPT

## 2021-04-25 PROCEDURE — 56420 I&D BARTHOLINS GLAND ABSCESS: CPT

## 2021-04-25 RX ORDER — IBUPROFEN 600 MG/1
600 TABLET ORAL EVERY 8 HOURS PRN
Qty: 15 TABLET | Refills: 0 | Status: SHIPPED | OUTPATIENT
Start: 2021-04-25 | End: 2021-04-30

## 2021-04-25 RX ORDER — MORPHINE SULFATE 4 MG/ML
8 INJECTION, SOLUTION INTRAMUSCULAR; INTRAVENOUS ONCE
Status: DISCONTINUED | OUTPATIENT
Start: 2021-04-25 | End: 2021-04-25

## 2021-04-25 RX ORDER — LIDOCAINE HYDROCHLORIDE 10 MG/ML
20 INJECTION, SOLUTION EPIDURAL; INFILTRATION; INTRACAUDAL; PERINEURAL ONCE
Status: COMPLETED | OUTPATIENT
Start: 2021-04-25 | End: 2021-04-25

## 2021-04-25 RX ORDER — HYDROCODONE BITARTRATE AND ACETAMINOPHEN 5; 325 MG/1; MG/1
1 TABLET ORAL EVERY 6 HOURS PRN
Qty: 10 TABLET | Refills: 0 | Status: SHIPPED | OUTPATIENT
Start: 2021-04-25

## 2021-04-25 RX ORDER — MORPHINE SULFATE 4 MG/ML
8 INJECTION, SOLUTION INTRAMUSCULAR; INTRAVENOUS ONCE
Status: COMPLETED | OUTPATIENT
Start: 2021-04-25 | End: 2021-04-25

## 2021-04-25 NOTE — ED PROVIDER NOTES
Is on Pred  Patient Seen in: [facility] AND [facility] Emergency Department      History   Patient presents with:  Cyst    Stated Complaint: cyst    HPI/Subjective:   HPI    48-year-old female now with mom with recurrent left-sided labial/Bartholin gland abscesses No surrounding cellulitis. No spontaneous drainage. Musculoskeletal: Normal range of motion. No edema or tenderness. Neurological: Alert and oriented to person, place, and time. Skin: Skin is warm and dry. Nursing note and vitals reviewed.     Diff Oral Tab  Take 1 tablet by mouth every 6 (six) hours as needed., Normal, Disp-10 tablet, R-0    ibuprofen 600 MG Oral Tab  Take 1 tablet (600 mg total) by mouth every 8 (eight) hours as needed for Pain or Fever., Normal, Disp-15 tablet, R-0    !! - Potenti

## 2021-04-27 ENCOUNTER — HOSPITAL ENCOUNTER (EMERGENCY)
Facility: HOSPITAL | Age: 26
Discharge: HOME | End: 2021-04-27
Payer: MEDICAID

## 2021-04-27 VITALS
WEIGHT: 120 LBS | HEART RATE: 93 BPM | OXYGEN SATURATION: 99 % | TEMPERATURE: 98 F | HEIGHT: 60 IN | SYSTOLIC BLOOD PRESSURE: 124 MMHG | DIASTOLIC BLOOD PRESSURE: 86 MMHG | BODY MASS INDEX: 23.56 KG/M2 | RESPIRATION RATE: 17 BRPM

## 2021-04-27 DIAGNOSIS — Z51.89 WOUND CHECK, ABSCESS: Primary | ICD-10-CM

## 2021-04-27 PROCEDURE — 99281 EMR DPT VST MAYX REQ PHY/QHP: CPT

## 2021-04-27 NOTE — ED PROVIDER NOTES
Patient Seen in: [facility] AND [facility] Emergency Department    History   CC: wound check  HPI: Saima Vargasons 31year old female  who presents for wound check.   States she had an abscess to her left labia for which she was evaluated in this emergency room 2 Besylate 5 MG Oral Tab,  Take 1 tablet (5 mg total) by mouth daily. azathioprine 50 MG Oral Tab,  Take 1 tablet (50 mg total) by mouth daily. Benzoyl Peroxide-Erythromycin 5-3 % External Gel,  Apply 1 Application topically 2 (two) times daily.    Hydrox instruction and agrees with plan of care.       Disposition and Plan     Clinical Impression:  Wound check, abscess  (primary encounter diagnosis)    Disposition:  Discharge    Follow-up:  Kelechi Rosario DO  [address]  [phone]

## 2021-04-27 NOTE — ED INITIAL ASSESSMENT (HPI)
Pt was here 2 days ago for Bartholin's abscess I& D done with packing placed,come in today  for wound recheck , pt denies pain at this time. Denies fever.

## 2021-05-20 ENCOUNTER — HOSPITAL ENCOUNTER (EMERGENCY)
Facility: HOSPITAL | Age: 26
Discharge: HOME | End: 2021-05-20
Payer: COMMERCIAL

## 2021-05-20 ENCOUNTER — APPOINTMENT (OUTPATIENT)
Dept: CT IMAGING | Facility: HOSPITAL | Age: 26
End: 2021-05-20
Attending: NURSE PRACTITIONER
Payer: COMMERCIAL

## 2021-05-20 VITALS
SYSTOLIC BLOOD PRESSURE: 130 MMHG | OXYGEN SATURATION: 100 % | BODY MASS INDEX: 22.58 KG/M2 | HEIGHT: 60 IN | HEART RATE: 104 BPM | RESPIRATION RATE: 18 BRPM | TEMPERATURE: 98 F | WEIGHT: 115 LBS | DIASTOLIC BLOOD PRESSURE: 91 MMHG

## 2021-05-20 DIAGNOSIS — S09.90XA CLOSED HEAD INJURY, INITIAL ENCOUNTER: ICD-10-CM

## 2021-05-20 DIAGNOSIS — S16.1XXA STRAIN OF NECK MUSCLE, INITIAL ENCOUNTER: ICD-10-CM
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
## 2021-05-20 PROCEDURE — 72125 CT NECK SPINE W/O DYE: CPT | Performed by: NURSE PRACTITIONER

## 2021-05-20 PROCEDURE — 99284 EMERGENCY DEPT VISIT MOD MDM: CPT

## 2021-05-20 PROCEDURE — 81025 URINE PREGNANCY TEST: CPT

## 2021-05-20 PROCEDURE — 70450 CT HEAD/BRAIN W/O DYE: CPT | Performed by: NURSE PRACTITIONER

## 2021-05-20 RX ORDER — TRAMADOL HYDROCHLORIDE 50 MG/1
50 TABLET ORAL ONCE
Status: COMPLETED | OUTPATIENT
Start: 2021-05-20 | End: 2021-05-20

## 2021-05-20 RX ORDER — TRAMADOL HYDROCHLORIDE 50 MG/1
TABLET ORAL EVERY 6 HOURS PRN
Qty: 10 TABLET | Refills: 0 | Status: SHIPPED | OUTPATIENT
Start: 2021-05-20 | End: 2021-05-27
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
## 2021-06-29 ENCOUNTER — HOSPITAL ENCOUNTER (EMERGENCY)
Facility: HOSPITAL | Age: 26
Discharge: HOME | End: 2021-06-29
Attending: EMERGENCY MEDICINE
Payer: MEDICAID

## 2021-06-29 VITALS
OXYGEN SATURATION: 99 % | HEIGHT: 60 IN | WEIGHT: 115 LBS | TEMPERATURE: 98 F | RESPIRATION RATE: 16 BRPM | DIASTOLIC BLOOD PRESSURE: 94 MMHG | BODY MASS INDEX: 22.58 KG/M2 | HEART RATE: 84 BPM | SYSTOLIC BLOOD PRESSURE: 143 MMHG

## 2021-06-29 DIAGNOSIS — N75.1 BARTHOLIN'S GLAND ABSCESS: Primary | ICD-10-CM

## 2021-06-29 PROCEDURE — 99283 EMERGENCY DEPT VISIT LOW MDM: CPT

## 2021-06-29 PROCEDURE — 56405 I&D VULVA/PERINEAL ABSCESS: CPT

## 2021-06-29 RX ORDER — CEPHALEXIN 500 MG/1
500 CAPSULE ORAL 4 TIMES DAILY
Qty: 28 CAPSULE | Refills: 0 | Status: SHIPPED | OUTPATIENT
Start: 2021-06-29 | End: 2021-07-06

## 2021-06-29 RX ORDER — IBUPROFEN 600 MG/1
600 TABLET ORAL ONCE
Status: DISCONTINUED | OUTPATIENT
Start: 2021-06-29 | End: 2021-06-29

## 2021-06-29 NOTE — ED INITIAL ASSESSMENT (HPI)
Pt report abscess to vagina x1 day. Not draining, no fever. Tylenol taken just pta. Pt mentions that this is her 6th abscess.

## 2021-06-29 NOTE — ED PROVIDER NOTES
Patient Seen in: [facility] AND [facility] Emergency Department      History   Patient presents with:  Abscess    Stated Complaint: abscess    HPI/Subjective:   HPI    The patient is a 79-year-old female with a history of lupus and recurrent Bartholin cyst absce Skin is warm and dry. Findings: No erythema. Neurological:      Mental Status: She is alert and oriented to person, place, and time.        Differential diagnosis includes abscess, Bartholin gland cyst        ED Course   Labs Reviewed - No data to [facility]

## 2021-08-01 ENCOUNTER — HOSPITAL ENCOUNTER (EMERGENCY)
Facility: HOSPITAL | Age: 26
Discharge: HOME | End: 2021-08-01
Payer: MEDICAID

## 2021-08-01 VITALS
SYSTOLIC BLOOD PRESSURE: 125 MMHG | WEIGHT: 115 LBS | RESPIRATION RATE: 18 BRPM | OXYGEN SATURATION: 99 % | TEMPERATURE: 97 F | HEART RATE: 111 BPM | HEIGHT: 60 IN | BODY MASS INDEX: 22.58 KG/M2 | DIASTOLIC BLOOD PRESSURE: 93 MMHG

## 2021-08-01 DIAGNOSIS — N75.0 BARTHOLIN CYST: Primary | ICD-10-CM

## 2021-08-01 LAB — B-HCG UR QL: NEGATIVE

## 2021-08-01 PROCEDURE — 96372 THER/PROPH/DIAG INJ SC/IM: CPT

## 2021-08-01 PROCEDURE — 81025 URINE PREGNANCY TEST: CPT

## 2021-08-01 PROCEDURE — 99283 EMERGENCY DEPT VISIT LOW MDM: CPT

## 2021-08-01 PROCEDURE — 56420 I&D BARTHOLINS GLAND ABSCESS: CPT

## 2021-08-01 RX ORDER — CLINDAMYCIN HYDROCHLORIDE 300 MG/1
300 CAPSULE ORAL 3 TIMES DAILY
Qty: 21 CAPSULE | Refills: 0 | Status: SHIPPED | OUTPATIENT
Start: 2021-08-01 | End: 2021-08-08

## 2021-08-01 RX ORDER — KETOROLAC TROMETHAMINE 30 MG/ML
60 INJECTION, SOLUTION INTRAMUSCULAR; INTRAVENOUS ONCE
Status: COMPLETED | OUTPATIENT
Start: 2021-08-01 | End: 2021-08-01

## 2021-08-01 NOTE — ED INITIAL ASSESSMENT (HPI)
Pt reports bartholin cyst for past 1-2 days, hx several in the past, sts she is due to have gland removed   Denies fevers or further complaints.

## 2021-08-02 NOTE — ED PROVIDER NOTES
Patient Seen in: [facility] AND [facility] Emergency Department      History   Patient presents with:  Eval-G    Stated Complaint: Cyst    HPI/Subjective:   HPI    Patient complains of skin infection for  2 days. Located on the left labia.   Describes as painfu Nose normal.      Mouth/Throat:      Mouth: Mucous membranes are moist.      Pharynx: Oropharynx is clear. Eyes:      Extraocular Movements: Extraocular movements intact.       Conjunctiva/sclera: Conjunctivae normal.      Pupils: Pupils are equal, round, monitoring for any signs of infection. She is not having any fevers. She was given Toradol for pain. Return to the ER as needed.                          Disposition and Plan     Clinical Impression:  Bartholin cyst  (primary encounter diagnosis)     Dis

## 2021-08-02 NOTE — CM/SW NOTE
ED CM spoke with he patient over the phone regarding establishing care with a gynecologist and a rheumatologist. Discussed with the patient that she needs to see her assigned PCP first in order to get a referral for a specialist. Pt ws instructed to call o

## 2021-08-02 NOTE — ED QUICK NOTES
Patient provided with discharge instructions. Verbalized understanding for plan of care at home and follow up. All questions/ concerns addressed prior to discharge.  Pt walked out of ER

## 2021-10-06 ENCOUNTER — HOSPITAL ENCOUNTER (EMERGENCY)
Facility: HOSPITAL | Age: 26
Discharge: LEFT BEFORE DISCHARGE | End: 2021-10-06
Payer: MEDICAID

## 2021-10-06 VITALS
WEIGHT: 105 LBS | BODY MASS INDEX: 20.62 KG/M2 | HEIGHT: 60 IN | SYSTOLIC BLOOD PRESSURE: 124 MMHG | HEART RATE: 118 BPM | RESPIRATION RATE: 18 BRPM | TEMPERATURE: 98 F | DIASTOLIC BLOOD PRESSURE: 73 MMHG | OXYGEN SATURATION: 98 %

## 2021-11-26 ENCOUNTER — HOSPITAL ENCOUNTER (EMERGENCY)
Facility: HOSPITAL | Age: 26
Discharge: HOME | End: 2021-11-26
Attending: EMERGENCY MEDICINE
Payer: MEDICAID

## 2021-11-26 VITALS
TEMPERATURE: 98 F | OXYGEN SATURATION: 98 % | BODY MASS INDEX: 20 KG/M2 | WEIGHT: 102 LBS | DIASTOLIC BLOOD PRESSURE: 83 MMHG | SYSTOLIC BLOOD PRESSURE: 134 MMHG | RESPIRATION RATE: 18 BRPM | HEART RATE: 87 BPM

## 2021-11-26 DIAGNOSIS — A60.09 HERPES GENITALIS IN WOMEN: Primary | ICD-10-CM

## 2021-11-26 PROCEDURE — 99283 EMERGENCY DEPT VISIT LOW MDM: CPT

## 2021-11-26 PROCEDURE — 99284 EMERGENCY DEPT VISIT MOD MDM: CPT

## 2021-11-26 RX ORDER — KETOROLAC TROMETHAMINE 10 MG/1
10 TABLET, FILM COATED ORAL EVERY 6 HOURS PRN
Qty: 15 TABLET | Refills: 0 | Status: SHIPPED | OUTPATIENT
Start: 2021-11-26 | End: 2021-12-03

## 2021-11-26 RX ORDER — VALACYCLOVIR HYDROCHLORIDE 1 G/1
1000 TABLET, FILM COATED ORAL EVERY 12 HOURS
Qty: 20 TABLET | Refills: 0 | Status: SHIPPED | OUTPATIENT
Start: 2021-11-26 | End: 2021-12-06

## 2021-11-26 NOTE — ED PROVIDER NOTES
Patient presents with:  Eval-G    Stated Complaint: eval g    HPI  Patient complains of skin rash for  few days. Located l labial region. Describes as painful red bumps, history of herpes feels the same. No fever or chills.   No involvement of the eyes History    Tobacco Use      Smoking status: Never Smoker      Smokeless tobacco: Never Used    Vaping Use      Vaping Use: Never used    Alcohol use: Yes      Comment: socially    Drug use: No      Review of Systems    Positive for stated complaint: joe boykin

## 2021-11-26 NOTE — ED INITIAL ASSESSMENT (HPI)
Patient complains of \"possible herpes outbreak\" on her genitals, states there are sores there for a couple of days

## 2022-07-09 ENCOUNTER — HOSPITAL ENCOUNTER (EMERGENCY)
Facility: HOSPITAL | Age: 27
Discharge: HOME | End: 2022-07-10
Attending: EMERGENCY MEDICINE
Payer: MEDICAID

## 2022-07-09 VITALS
SYSTOLIC BLOOD PRESSURE: 114 MMHG | DIASTOLIC BLOOD PRESSURE: 74 MMHG | RESPIRATION RATE: 18 BRPM | OXYGEN SATURATION: 100 % | HEART RATE: 113 BPM | TEMPERATURE: 99 F

## 2022-07-09 DIAGNOSIS — R10.9 FLANK PAIN: ICD-10-CM

## 2022-07-09 DIAGNOSIS — R51.9 NONINTRACTABLE HEADACHE, UNSPECIFIED CHRONICITY PATTERN, UNSPECIFIED HEADACHE TYPE: Primary | ICD-10-CM

## 2022-07-09 DIAGNOSIS — E86.0 DEHYDRATION: ICD-10-CM

## 2022-07-09 LAB
B-HCG UR QL: NEGATIVE
BASOPHILS # BLD AUTO: 0.04 X10(3) UL (ref 0–0.2)
BASOPHILS NFR BLD AUTO: 0.4 %
BILIRUB UR QL: NEGATIVE
COLOR UR: YELLOW
DEPRECATED RDW RBC AUTO: 55.5 FL (ref 35.1–46.3)
EOSINOPHIL # BLD AUTO: 0.06 X10(3) UL (ref 0–0.7)
EOSINOPHIL NFR BLD AUTO: 0.6 %
ERYTHROCYTE [DISTWIDTH] IN BLOOD BY AUTOMATED COUNT: 15.5 % (ref 11–15)
GLUCOSE UR-MCNC: NEGATIVE MG/DL
HCT VFR BLD AUTO: 29.6 %
HGB BLD-MCNC: 9 G/DL
IMM GRANULOCYTES # BLD AUTO: 0.04 X10(3) UL (ref 0–1)
IMM GRANULOCYTES NFR BLD: 0.4 %
KETONES UR-MCNC: NEGATIVE MG/DL
LYMPHOCYTES # BLD AUTO: 1.72 X10(3) UL (ref 1–4)
LYMPHOCYTES NFR BLD AUTO: 16.7 %
MCH RBC QN AUTO: 29.6 PG (ref 26–34)
MCHC RBC AUTO-ENTMCNC: 30.4 G/DL (ref 31–37)
MCV RBC AUTO: 97.4 FL
MONOCYTES # BLD AUTO: 1.21 X10(3) UL (ref 0.1–1)
MONOCYTES NFR BLD AUTO: 11.8 %
NEUTROPHILS # BLD AUTO: 7.22 X10 (3) UL (ref 1.5–7.7)
NEUTROPHILS # BLD AUTO: 7.22 X10(3) UL (ref 1.5–7.7)
NEUTROPHILS NFR BLD AUTO: 70.1 %
NITRITE UR QL STRIP.AUTO: NEGATIVE
PH UR: 6 [PH] (ref 5–8)
PLATELET # BLD AUTO: 182 10(3)UL (ref 150–450)
PROT UR-MCNC: 30 MG/DL
RBC # BLD AUTO: 3.04 X10(6)UL
SP GR UR STRIP: 1.01 (ref 1–1.03)
UROBILINOGEN UR STRIP-ACNC: <2
VIT C UR-MCNC: NEGATIVE MG/DL
WBC # BLD AUTO: 10.3 X10(3) UL (ref 4–11)

## 2022-07-09 PROCEDURE — 87086 URINE CULTURE/COLONY COUNT: CPT | Performed by: EMERGENCY MEDICINE

## 2022-07-09 PROCEDURE — 96375 TX/PRO/DX INJ NEW DRUG ADDON: CPT

## 2022-07-09 PROCEDURE — 96374 THER/PROPH/DIAG INJ IV PUSH: CPT

## 2022-07-09 PROCEDURE — 85025 COMPLETE CBC W/AUTO DIFF WBC: CPT | Performed by: EMERGENCY MEDICINE

## 2022-07-09 PROCEDURE — 80048 BASIC METABOLIC PNL TOTAL CA: CPT | Performed by: EMERGENCY MEDICINE

## 2022-07-09 PROCEDURE — 81001 URINALYSIS AUTO W/SCOPE: CPT | Performed by: EMERGENCY MEDICINE

## 2022-07-09 PROCEDURE — 96361 HYDRATE IV INFUSION ADD-ON: CPT

## 2022-07-09 PROCEDURE — 99284 EMERGENCY DEPT VISIT MOD MDM: CPT

## 2022-07-09 PROCEDURE — 81025 URINE PREGNANCY TEST: CPT

## 2022-07-09 RX ORDER — METOCLOPRAMIDE HYDROCHLORIDE 5 MG/ML
5 INJECTION INTRAMUSCULAR; INTRAVENOUS ONCE
Status: COMPLETED | OUTPATIENT
Start: 2022-07-09 | End: 2022-07-09

## 2022-07-09 RX ORDER — DIPHENHYDRAMINE HYDROCHLORIDE 50 MG/ML
12.5 INJECTION INTRAMUSCULAR; INTRAVENOUS ONCE
Status: COMPLETED | OUTPATIENT
Start: 2022-07-09 | End: 2022-07-09

## 2022-07-10 LAB
ANION GAP SERPL CALC-SCNC: 12 MMOL/L (ref 0–18)
BUN BLD-MCNC: 41 MG/DL (ref 7–18)
BUN/CREAT SERPL: 11.4 (ref 10–20)
CALCIUM BLD-MCNC: 8.9 MG/DL (ref 8.5–10.1)
CHLORIDE SERPL-SCNC: 104 MMOL/L (ref 98–112)
CO2 SERPL-SCNC: 20 MMOL/L (ref 21–32)
CREAT BLD-MCNC: 3.59 MG/DL
GLUCOSE BLD-MCNC: 95 MG/DL (ref 70–99)
OSMOLALITY SERPL CALC.SUM OF ELEC: 292 MOSM/KG (ref 275–295)
POTASSIUM SERPL-SCNC: 4 MMOL/L (ref 3.5–5.1)
SODIUM SERPL-SCNC: 136 MMOL/L (ref 136–145)

## 2022-07-10 RX ORDER — METOCLOPRAMIDE 10 MG/1
10 TABLET ORAL 3 TIMES DAILY PRN
Qty: 10 TABLET | Refills: 0 | Status: SHIPPED | OUTPATIENT
Start: 2022-07-10 | End: 2022-07-13
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
## 2022-10-22 ENCOUNTER — HOSPITAL ENCOUNTER (EMERGENCY)
Facility: HOSPITAL | Age: 27
Discharge: LEFT BEFORE DISCHARGE | End: 2022-10-22
Attending: STUDENT IN AN ORGANIZED HEALTH CARE EDUCATION/TRAINING PROGRAM
Payer: MEDICAID

## 2022-10-22 VITALS
HEIGHT: 60 IN | DIASTOLIC BLOOD PRESSURE: 77 MMHG | TEMPERATURE: 98 F | RESPIRATION RATE: 16 BRPM | SYSTOLIC BLOOD PRESSURE: 126 MMHG | WEIGHT: 110 LBS | OXYGEN SATURATION: 97 % | BODY MASS INDEX: 21.6 KG/M2 | HEART RATE: 85 BPM

## 2022-10-22 NOTE — ED INITIAL ASSESSMENT (HPI)
Pt reports painful Bartholin's cyst. States she has had this in the past and had it drained. Denies urinary symptoms or fevers.
anjelica all pertinent systems normal

## 2022-10-22 NOTE — ED QUICK NOTES
Patient reported to this RN that she went to the bathroom and \"the cyst popped\" and that \"it no longer hurts\". Patient left department at [phone] prior to being seen/assessed by MD Jason Bailey.

## 2024-09-25 NOTE — TELEPHONE ENCOUNTER
Problem: Pain - Adult  Goal: Verbalizes/displays adequate comfort level or baseline comfort level  Outcome: Progressing     Problem: Safety - Adult  Goal: Free from fall injury  Outcome: Progressing     Problem: Discharge Planning  Goal: Discharge to home or other facility with appropriate resources  Outcome: Progressing     Problem: Chronic Conditions and Co-morbidities  Goal: Patient's chronic conditions and co-morbidity symptoms are monitored and maintained or improved  Outcome: Progressing     Problem: Fall/Injury  Goal: Not fall by end of shift  Outcome: Progressing   The patient's goals for the shift include      The clinical goals for the shift include Patient will remain safe and free of falls    Over the shift, the patient did not make progress toward the following goals. Barriers to progression include patient experienced an unresponsive episode and loss of consciousness. Recommendations to address these barriers include frequent rounding on patient.     Talked to [facility] mom of pt , will call back to make appt with Dr Denver San.

## (undated) NOTE — ED AVS SNAPSHOT
Kenneth Shayan   MRN: [id number]    Department:  [facility] Emergency Department   Date of Visit:  1/29/2019           Disclosure     Insurance plans vary and the physician(s) referred by the ER may not be covered by your plan.  Please contac CARE PHYSICIAN AT ONCE OR RETURN IMMEDIATELY TO THE EMERGENCY DEPARTMENT. If you have been prescribed any medication(s), please fill your prescription right away and begin taking the medication(s) as directed.   If you believe that any of the medications

## (undated) NOTE — LETTER
01/25/19        Sobeida RAYO [address],  [phone] [id number]      Dear Sobeida Irvin,    Our records indicate that you have outstanding lab work and or testing that was ordered for you and has not yet been completed:  No orders of the defined types were

## (undated) NOTE — LETTER
January 29, 2019    Patient: Mary Ann Johnson   Date of Visit: 1/29/2019       To Whom It May Concern:    Jyoti Cash was seen and treated in our emergency department on 1/29/2019. She should not return to work until 1/31/19.     If you have any qu

## (undated) NOTE — LETTER
No referring provider defined for this encounter. 01/09/19        Patient: Mendel Binder   YOB: 1995   Date of Visit: 1/9/2019       Dear  Dr. Alyssa Coffey Recipients,      Thank you for referring Mendel Binder to my practice.   Mihaela

## (undated) NOTE — ED AVS SNAPSHOT
Bryan Bansal   MRN: [id number]    Department:  [facility] Emergency Department   Date of Visit:  4/7/2019           Disclosure     Insurance plans vary and the physician(s) referred by the ER may not be covered by your plan.  Please contact CARE PHYSICIAN AT ONCE OR RETURN IMMEDIATELY TO THE EMERGENCY DEPARTMENT. If you have been prescribed any medication(s), please fill your prescription right away and begin taking the medication(s) as directed.   If you believe that any of the medications

## (undated) NOTE — ED AVS SNAPSHOT
Kenneth Downey   MRN: [id number]    Department:  [facility] Emergency Department   Date of Visit:  8/14/2019           Disclosure     Insurance plans vary and the physician(s) referred by the ER may not be covered by your plan.  Please contac CARE PHYSICIAN AT ONCE OR RETURN IMMEDIATELY TO THE EMERGENCY DEPARTMENT. If you have been prescribed any medication(s), please fill your prescription right away and begin taking the medication(s) as directed.   If you believe that any of the medications

## (undated) NOTE — ED AVS SNAPSHOT
Titi Gramajo   MRN: [id number]    Department:  [facility] Emergency Department   Date of Visit:  12/7/2017           Disclosure     Insurance plans vary and the physician(s) referred by the ER may not be covered by your plan.  Please contact CARE PHYSICIAN AT ONCE OR RETURN IMMEDIATELY TO THE EMERGENCY DEPARTMENT. If you have been prescribed any medication(s), please fill your prescription right away and begin taking the medication(s) as directed.   If you believe that any of the medications

## (undated) NOTE — ED AVS SNAPSHOT
Aletha Livingston   MRN: [id number]    Department:  [facility] Emergency Department   Date of Visit:  9/27/2018           Disclosure     Insurance plans vary and the physician(s) referred by the ER may not be covered by your plan.  Please contact CARE PHYSICIAN AT ONCE OR RETURN IMMEDIATELY TO THE EMERGENCY DEPARTMENT. If you have been prescribed any medication(s), please fill your prescription right away and begin taking the medication(s) as directed.   If you believe that any of the medications